# Patient Record
Sex: FEMALE | Race: WHITE | Employment: UNEMPLOYED | URBAN - METROPOLITAN AREA
[De-identification: names, ages, dates, MRNs, and addresses within clinical notes are randomized per-mention and may not be internally consistent; named-entity substitution may affect disease eponyms.]

---

## 2021-09-22 ENCOUNTER — HOSPITAL ENCOUNTER (OUTPATIENT)
Dept: LAB | Age: 76
Discharge: HOME | End: 2021-09-22

## 2021-09-22 LAB
ANION GAP SERPL CALC-SCNC: 7 MMOL/L (ref 3–18)
BUN SERPL-MCNC: 15 MG/DL (ref 7–18)
BUN/CREAT SERPL: 19 (ref 12–20)
CALCIUM SERPL-MCNC: 8.4 MG/DL (ref 8.5–10.1)
CHLORIDE SERPL-SCNC: 108 MMOL/L (ref 100–111)
CO2 SERPL-SCNC: 26 MMOL/L (ref 21–32)
CREAT SERPL-MCNC: 0.78 MG/DL (ref 0.6–1.3)
ERYTHROCYTE [DISTWIDTH] IN BLOOD BY AUTOMATED COUNT: 14.4 % (ref 11.6–14.5)
GLUCOSE SERPL-MCNC: 87 MG/DL (ref 74–99)
HCT VFR BLD AUTO: 29.7 % (ref 35–45)
HGB BLD-MCNC: 9.6 G/DL (ref 12–16)
MCH RBC QN AUTO: 32.7 PG (ref 24–34)
MCHC RBC AUTO-ENTMCNC: 32.3 G/DL (ref 31–37)
MCV RBC AUTO: 101 FL (ref 78–100)
PLATELET # BLD AUTO: 477 K/UL (ref 135–420)
PMV BLD AUTO: 9.3 FL (ref 9.2–11.8)
POTASSIUM SERPL-SCNC: 3.9 MMOL/L (ref 3.5–5.5)
RBC # BLD AUTO: 2.94 M/UL (ref 4.2–5.3)
SODIUM SERPL-SCNC: 141 MMOL/L (ref 136–145)
WBC # BLD AUTO: 8.6 K/UL (ref 4.6–13.2)

## 2021-09-22 PROCEDURE — U0003 INFECTIOUS AGENT DETECTION BY NUCLEIC ACID (DNA OR RNA); SEVERE ACUTE RESPIRATORY SYNDROME CORONAVIRUS 2 (SARS-COV-2) (CORONAVIRUS DISEASE [COVID-19]), AMPLIFIED PROBE TECHNIQUE, MAKING USE OF HIGH THROUGHPUT TECHNOLOGIES AS DESCRIBED BY CMS-2020-01-R: HCPCS

## 2021-09-22 PROCEDURE — 80048 BASIC METABOLIC PNL TOTAL CA: CPT

## 2021-09-22 PROCEDURE — 85027 COMPLETE CBC AUTOMATED: CPT

## 2021-09-23 LAB — SARS-COV-2, COV2NT: NOT DETECTED

## 2021-09-29 ENCOUNTER — HOSPITAL ENCOUNTER (OUTPATIENT)
Dept: LAB | Age: 76
Discharge: HOME | End: 2021-09-29

## 2021-09-29 PROCEDURE — U0003 INFECTIOUS AGENT DETECTION BY NUCLEIC ACID (DNA OR RNA); SEVERE ACUTE RESPIRATORY SYNDROME CORONAVIRUS 2 (SARS-COV-2) (CORONAVIRUS DISEASE [COVID-19]), AMPLIFIED PROBE TECHNIQUE, MAKING USE OF HIGH THROUGHPUT TECHNOLOGIES AS DESCRIBED BY CMS-2020-01-R: HCPCS

## 2021-09-30 LAB — SARS-COV-2, COV2NT: NOT DETECTED

## 2021-10-06 ENCOUNTER — HOSPITAL ENCOUNTER (OUTPATIENT)
Dept: LAB | Age: 76
Discharge: HOME | End: 2021-10-06

## 2021-10-06 PROCEDURE — U0003 INFECTIOUS AGENT DETECTION BY NUCLEIC ACID (DNA OR RNA); SEVERE ACUTE RESPIRATORY SYNDROME CORONAVIRUS 2 (SARS-COV-2) (CORONAVIRUS DISEASE [COVID-19]), AMPLIFIED PROBE TECHNIQUE, MAKING USE OF HIGH THROUGHPUT TECHNOLOGIES AS DESCRIBED BY CMS-2020-01-R: HCPCS

## 2021-10-07 LAB — SARS-COV-2, COV2NT: NOT DETECTED

## 2021-10-14 ENCOUNTER — HOME HEALTH ADMISSION (OUTPATIENT)
Dept: HOME HEALTH SERVICES | Facility: HOME HEALTH | Age: 76
End: 2021-10-14
Payer: MEDICARE

## 2021-10-15 ENCOUNTER — HOME CARE VISIT (OUTPATIENT)
Dept: HOME HEALTH SERVICES | Facility: HOME HEALTH | Age: 76
End: 2021-10-15
Payer: MEDICARE

## 2021-10-15 ENCOUNTER — HOME CARE VISIT (OUTPATIENT)
Dept: SCHEDULING | Facility: HOME HEALTH | Age: 76
End: 2021-10-15
Payer: MEDICARE

## 2021-10-15 PROCEDURE — 3331090002 HH PPS REVENUE DEBIT

## 2021-10-15 PROCEDURE — 400013 HH SOC

## 2021-10-15 PROCEDURE — G0299 HHS/HOSPICE OF RN EA 15 MIN: HCPCS

## 2021-10-15 PROCEDURE — A4452 WATERPROOF TAPE: HCPCS

## 2021-10-15 PROCEDURE — 3331090001 HH PPS REVENUE CREDIT

## 2021-10-15 PROCEDURE — 400018 HH-NO PAY CLAIM PROCEDURE

## 2021-10-15 PROCEDURE — MED11779

## 2021-10-15 PROCEDURE — MED10821 BANDAGE,GAUZE,4.5"X4.1YD,STERILE,LF

## 2021-10-15 PROCEDURE — MED12268

## 2021-10-16 VITALS
TEMPERATURE: 97.3 F | HEART RATE: 71 BPM | RESPIRATION RATE: 16 BRPM | SYSTOLIC BLOOD PRESSURE: 159 MMHG | DIASTOLIC BLOOD PRESSURE: 89 MMHG | OXYGEN SATURATION: 94 %

## 2021-10-16 PROCEDURE — 3331090001 HH PPS REVENUE CREDIT

## 2021-10-16 PROCEDURE — 3331090002 HH PPS REVENUE DEBIT

## 2021-10-16 NOTE — HOME HEALTH
Skilled services/Home bound verification:     Skilled Reason for admission/summary of clinical condition:  Patient was recently hospitalized for UTI, requiring observation by a SN for s/s of decomposition or adverse effects resulting from newly prescribed medications. Skilled observation needed to determine if new medication regimen prescribed requires modifications or other therapeutic interventions considered until pt's clinical condition or treatment has stabilized. .  This patient is homebound for the following reasons Requires considerable and taxing effort to leave the home , Requires the assistance of 1 or more persons to leave the home  and Only leaves the home for medical reasons or [employer] services and are infrequent and of short duration for other reasons  Caregiver: spouse. Caregiver assists with Caregiver assists patient with bathing, dressing, bathroom, meal prep and setup, medication management, grocery shopping, household chores, transportation to MD appointment and home exercise program..    Medications reconciled and all medications are available in the home this visit. The following education was provided regarding medications, medication interactions, and look alike medications (specify): menthol-zinc oxide (CALMOSEPTINE) 0.44-20.6 % oint. Medications  are effective at this time. High risk medication teaching regarding anticoagulants, hyperglycemic agents or opoid narcotics performed (specify) Jacy MAC Confer, NP notified of any discrepancies/medication interactions. Home health supplies by type and quantity ordered/delivered this visit include: All wound care supplies    Patient education provided this visit to include:   . X. Kandice Graven Kandice Graven Kandice Graven  Disease Management: Need for increased protein teaching performed about Boost, Ensure, etc 2 x daily to help with pressure ulcers and prevent pressure teaching, floating heels to prevent pressure areas, pain management, constipation prevention, fall precautions, s/s of infection, deep breathing exercises. .x.... Breathing Exercises (HEP):): Reviewed HEP in admission packet - patient performed return demonstration of deep breathing exercise. Patient to perform 5 deep breathing exercises every 2 hours, while awake. ..X... Wound Care:   Keep wound clean, covered and dry, s/s of infection  . Byron Carwin... Infection Control:   Proper hand washing with soap and water, after each trip to the bathroom and after eating meals  . Byron Carwin... Other:  safety and fall precautions, importance of repositioning patient every 2 hours to reduce pressure points, David's sign. Patient/caregiver degree of understanding:good    Home exercise program/Homework provided:     Pt/Caregiver instructed on plan of care and are agreeable to plan of care at this time. Physician Javier Vieira NP notified of patient admission to home health and plan of care including anticipated frequency of SN and treatments/interventions/modalities of SN. Discharge planning discussed with patient and caregiver. Discharge planning as follows: SN discharge when teaching and goals are met. Denise Mckeon Pt/Caregiver did verbalize understanding of discharge planning. Next MD appointment TBD (date) with Javier Vieira NP. Patient/caregiver encouraged/instructed to keep appointment as lack of follow through with physician appointment could result in discontinuation of home care services for non-compliance. Need for increased protein, teaching performed on Boost, Ensure, etc 2 x daily to help with pressure ulcers    Case communication with Intake and [place] regarding patient supposed to be having PT, OT, HHA according to her discharge paperwork, but it was not ordered by MD. Need to get order for for things, as patient is currently wheelchair bound and wants to be ambulating  like she was prior to this hospitalization.

## 2021-10-17 PROCEDURE — 3331090001 HH PPS REVENUE CREDIT

## 2021-10-17 PROCEDURE — 3331090002 HH PPS REVENUE DEBIT

## 2021-10-18 PROCEDURE — 3331090002 HH PPS REVENUE DEBIT

## 2021-10-18 PROCEDURE — 3331090001 HH PPS REVENUE CREDIT

## 2021-10-19 ENCOUNTER — HOME CARE VISIT (OUTPATIENT)
Dept: HOME HEALTH SERVICES | Facility: HOME HEALTH | Age: 76
End: 2021-10-19
Payer: MEDICARE

## 2021-10-19 ENCOUNTER — HOME CARE VISIT (OUTPATIENT)
Dept: SCHEDULING | Facility: HOME HEALTH | Age: 76
End: 2021-10-19
Payer: MEDICARE

## 2021-10-19 PROCEDURE — G0152 HHCP-SERV OF OT,EA 15 MIN: HCPCS

## 2021-10-19 PROCEDURE — 3331090002 HH PPS REVENUE DEBIT

## 2021-10-19 PROCEDURE — 3331090001 HH PPS REVENUE CREDIT

## 2021-10-19 NOTE — Clinical Note
Therapy Functional Score Assessment  Question   Score   Grooming  1       Upper Dressing 1      Lower Dressing 3      Bathing   5     Toilet Transfer 3     Transfer   5           Ambulation 6    Dyspnea                     0      Pain Interfering with activity 0  Est number therapy visits      10

## 2021-10-20 VITALS
OXYGEN SATURATION: 97 % | DIASTOLIC BLOOD PRESSURE: 75 MMHG | RESPIRATION RATE: 18 BRPM | HEART RATE: 84 BPM | TEMPERATURE: 97.6 F | SYSTOLIC BLOOD PRESSURE: 136 MMHG

## 2021-10-20 PROCEDURE — 3331090002 HH PPS REVENUE DEBIT

## 2021-10-20 PROCEDURE — 3331090001 HH PPS REVENUE CREDIT

## 2021-10-20 NOTE — HOME HEALTH
Caregiver involvement: Pt has  available in home for assist with ADLs and IADLs. Medications reconciled and all medications are available in the home this visit. The following education was provided regarding medications, medication interactions, and look a like medications: Continue as prescribed by MD.  Medications are effective at this time. Home health supplies by type and quantity ordered/delivered this visit include: N/A    Patient education provided this visit: Pt was educated to increase elevation of RLE to reduce swelling. Recommended long handled shower hose,  mat, and grab bars in walk-in shower once pt has met [facility] goals. Progress toward goals: Pt demonstrates good rehab potential as evidenced by motivation to return to [facility] and supportive  for assistance. Home exercise program: Plan to provide RUE AROM HEP and LUE strengthening HEP to increase BUE functiion for increased safety in mobility. CONTINUED NEED FOR THE FOLLOWING SKILLS: HH OT is medically necessary to address barriers of  reduced functional mobility, RUE ROM, LUE strength, and ADL function. These barriers limit pt's participation in ADLs safely and would benefit from continued skilled OT to maximize independence and reduce burden on caregiver. The following discharge planning was discussed with the pt/caregiver: 2w5 with plan to dc to home environment after all [facility] goals have been met. Pt is a 77year old female who was recently hospitalized for UTI.

## 2021-10-21 ENCOUNTER — HOME CARE VISIT (OUTPATIENT)
Dept: SCHEDULING | Facility: HOME HEALTH | Age: 76
End: 2021-10-21
Payer: MEDICARE

## 2021-10-21 PROCEDURE — 3331090001 HH PPS REVENUE CREDIT

## 2021-10-21 PROCEDURE — G0300 HHS/HOSPICE OF LPN EA 15 MIN: HCPCS

## 2021-10-21 PROCEDURE — 3331090002 HH PPS REVENUE DEBIT

## 2021-10-22 ENCOUNTER — HOME CARE VISIT (OUTPATIENT)
Dept: SCHEDULING | Facility: HOME HEALTH | Age: 76
End: 2021-10-22
Payer: MEDICARE

## 2021-10-22 ENCOUNTER — HOME CARE VISIT (OUTPATIENT)
Dept: HOME HEALTH SERVICES | Facility: HOME HEALTH | Age: 76
End: 2021-10-22
Payer: MEDICARE

## 2021-10-22 VITALS
SYSTOLIC BLOOD PRESSURE: 152 MMHG | OXYGEN SATURATION: 98 % | HEART RATE: 82 BPM | DIASTOLIC BLOOD PRESSURE: 90 MMHG | TEMPERATURE: 97.8 F | RESPIRATION RATE: 16 BRPM

## 2021-10-22 VITALS
OXYGEN SATURATION: 98 % | TEMPERATURE: 98 F | RESPIRATION RATE: 18 BRPM | HEART RATE: 94 BPM | SYSTOLIC BLOOD PRESSURE: 162 MMHG | DIASTOLIC BLOOD PRESSURE: 98 MMHG

## 2021-10-22 PROCEDURE — 3331090002 HH PPS REVENUE DEBIT

## 2021-10-22 PROCEDURE — G0151 HHCP-SERV OF PT,EA 15 MIN: HCPCS

## 2021-10-22 PROCEDURE — 3331090001 HH PPS REVENUE CREDIT

## 2021-10-22 PROCEDURE — G0158 HHC OT ASSISTANT EA 15: HCPCS

## 2021-10-22 NOTE — HOME HEALTH
PT INITIAL EVALUATION    Past Medical Hx:   Hypertension    Osteoporosis    Osteoarthritis    Polio    Left Hip Arthroplasty   Recent H/o current illness:  77 year old female presents with MD referral for HHPT s/p hospitalization due to fall and HTN with HF  Medication Management:  manages medications  Social hx and home eval:  Pt lives in 2 story home with . Caregiver Involvement:  helps with all ADL's, functioal mobility  PLOF:  Pt PLOF is ambulation w PUW, pts base level of function independent with all ADL's  BALANCE:     Seated unsupported balance is fair+   Standing static balance is unable  Standing dynamic balance is unable  Patient is at risk for falls due to recent hospitalization/weakness  BLE Strength:  Left Hip flexion 0/5 , hip abduction 0/5, hip adduction 0/5, Knee flexion 0/5  knee extension 0/5, ankle dorsiflexion 0/5  Right Hip flexion 2-/5 , hip abduction 2-/5, hip adduction 2-/5, Knee flexion 2-/5  knee extension 2-/5, ankle dorsiflexion 1/5  BLE ROM:  Right hip/knee/ankle: WFL  Left hip/knee/ankle: WFL  Bed mobility:  max A   Transfers:  max A for bed to chair, with slideboard AD. max cues and instruction needed for sequencing and coordination  GAIT:  Patient is non-ambulatory  Pt required max cues for  safety and sequencing  Stairs: unable  Patient education provided this visit: Safety with functional mobility and instruction with HEP. Instructed patient with regards to signs and symptoms of infection. Assessment: Referral for HHPT following recent hospitalization due to HTN with HF. HHPT is medically necessary to address the following clinical findings: decreased BLE strength and ROM, decreased I and safety with transfers and gait, decreased endurance, decreased balance and decreased safety in order to improve functional mobility and decrease fall risk. Pt is a fall risk as indicated by unable to stand.   Patient will be seen for HHPT 1w1, 2w6, 1w1 for therapeutic exercises to increase BLE strength and ROM,  stairs and transfers to increase I and safety with daily functional mobility and balance and endurance activities to decrease fall risk, decrease impairment and increase functional mobility and independence in the home. Pt. requires skilled PT intervention to instruct Pt. with gait training with LRAD, ROM and strengthening, transfer training, balance training, manual therapy, neuromuscular re-education, patient care-giver education, HEP, endurance training, body mechanics.

## 2021-10-22 NOTE — HOME HEALTH
SUBJECTIVE: Patient very pleasant and agreeable to OT Mandi Odonnells. Thony Negron CAREGIVER INVOLVEMENT/ASSISTANCE NEEDED FOR: Patient  helps with I/ADLs in the home. Thony Negron HOME HEALTH SUPPLIES BY TYPE AND QUANTITY ORDERED/DELIVERED THIS VISIT INCLUDE: N/A  . OBJECTIVE:  See interventions. .  ASSESSMENT OF PROGRESS TOWARD GOALS: Patient blood pressure on this visit. COE notice increased swelling in BLE. patient and her  stated she was fearfull of falling and they think that increased her blood pressure. COE Provided education on the importance of patient and caregiver prompting up client foof due to edema and the importance of keeping tract of blood pressure. COE attempted to contact the patient MD to report incresed blood pressures and swelling however PCP was not listed on chart and was listed later by nursing supevisor however client has never seen MD. Interdispliancry team will disccuss plans moving forward with this client. Jerrod and hisband intrsted to go to ED if blood pressure continue to elevate. .  CONTINUED NEED FOR THE FOLLOWING SKILLS:  [facility] OT is medically necessary to address barriers of  reduced functional mobility, RUE ROM, LUE strength, and ADL function. These barriers limit pt's participation in ADLs safely and would benefit from continued skilled OT to maximize independence and reduce burden on caregiver. Thony Negron PLAN FOR NEXT VISIT:  COE will addess RUE AROM HEP to increase ROM towards [facility] in order to increase safety and functional mobility in home environment within 10 visits. .  THE FOLLOWING DISCHARGE PLANNING WAS DISCUSSED WITH THE PATIENT/CAREGIVER: 2w5 with plan to dc to home environment after all [facility] goals have been met.

## 2021-10-23 PROCEDURE — 3331090001 HH PPS REVENUE CREDIT

## 2021-10-23 PROCEDURE — 3331090002 HH PPS REVENUE DEBIT

## 2021-10-24 PROCEDURE — 3331090001 HH PPS REVENUE CREDIT

## 2021-10-24 PROCEDURE — 3331090002 HH PPS REVENUE DEBIT

## 2021-10-25 PROCEDURE — 3331090001 HH PPS REVENUE CREDIT

## 2021-10-25 PROCEDURE — 3331090002 HH PPS REVENUE DEBIT

## 2021-10-26 ENCOUNTER — HOME CARE VISIT (OUTPATIENT)
Dept: SCHEDULING | Facility: HOME HEALTH | Age: 76
End: 2021-10-26
Payer: MEDICARE

## 2021-10-26 VITALS
TEMPERATURE: 98.3 F | SYSTOLIC BLOOD PRESSURE: 163 MMHG | HEART RATE: 83 BPM | DIASTOLIC BLOOD PRESSURE: 79 MMHG | OXYGEN SATURATION: 95 %

## 2021-10-26 VITALS
DIASTOLIC BLOOD PRESSURE: 80 MMHG | SYSTOLIC BLOOD PRESSURE: 145 MMHG | OXYGEN SATURATION: 99 % | TEMPERATURE: 97.6 F | HEART RATE: 69 BPM | RESPIRATION RATE: 18 BRPM

## 2021-10-26 PROCEDURE — 3331090001 HH PPS REVENUE CREDIT

## 2021-10-26 PROCEDURE — 3331090002 HH PPS REVENUE DEBIT

## 2021-10-26 PROCEDURE — G0152 HHCP-SERV OF OT,EA 15 MIN: HCPCS

## 2021-10-26 PROCEDURE — G0157 HHC PT ASSISTANT EA 15: HCPCS

## 2021-10-26 NOTE — HOME HEALTH
SUBJECTIVE: Pt doing well no c/o pain, no changes in medications spouse reports pt is gaining use of right UE  OBJECTIVE:  See interventions. ASSESSMENT:  Patient requires skilled PT for instruction in strengthening activities, balance and coordination activities as well as transfer and bed mobility tng. to increase independence and assist in return to prior level of function. Patient instructed in HEP consisting of supine and seated exercises to improve strength and facilitate increased independence with functional mobility. Pt. was also able to return demonstrate the HEP as instructed. Bed mobility requires Mod-Max A for supine<>sit transfers and to scoot to head of bed, Max A for sit to stand transfers from edge of bed. Cuing to improve posture with standing Max A to maintain standing position pt able to tolerate standing for 10 sec.  Pt instructed in pressure relief tech able to verbalize understanding of techniques to prevent areas of pressure  DISCUSSED POC AND DC PLANS WITH PATIENT/CAREGIVER: Pt and Spouse are aware of frequency of 2wk6, 1wk1 anticiapated DC 12/6/2021   PLAN FOR NEXT VISIT: Next visit will address strength, sit to stand transfers and bed mobiity

## 2021-10-27 VITALS
HEART RATE: 82 BPM | TEMPERATURE: 97.7 F | RESPIRATION RATE: 16 BRPM | SYSTOLIC BLOOD PRESSURE: 152 MMHG | OXYGEN SATURATION: 97 % | DIASTOLIC BLOOD PRESSURE: 88 MMHG

## 2021-10-27 PROCEDURE — 3331090001 HH PPS REVENUE CREDIT

## 2021-10-27 PROCEDURE — 3331090002 HH PPS REVENUE DEBIT

## 2021-10-27 NOTE — HOME HEALTH
SN reason for visit: education/teaching related to medication mgt ,disease mgt and assessment - hypertension  patient made aware to monitor for s/s of infection (not observed) [increased swelling, increased redness around site, increased pain, foul smelling drainage, fever] aware who to report to/when. Caregiver involvement: Patient's cg is available at all times for assistance with iadls, adls, meal prep, medication management, taking to md appointments. Medications reconciled . The following education was provided regarding medications, medication interactions, and look a like medications. Home health supplies by type and quantity ordered/delivered this visit include: n/a  Patient education provided this visit: assessment, teaching  Reviewed medications and care plan for changes. patient/cg to continue to take medications as prescribed. patient aware to monitor for effectiveness and to notify staff of any adverse reactions to medications/any changes to medication regimen. reviewed side effects, purposes, dosage, frequencies  patient encouraged to monitor for increase in pain and to continue with current pain management and to notify staff/md if pain becomes excrutiating/intolerable. Patient is aware of fall risk. Reviewed safet precautions with patient. Educated on ambulation and or bedbound safety if applicable. INSTRUCTED PATIENT AND CG THAT SHOULD ANY NEEDS OR CONCERNS ARISE TO FIRST CALL OUR OFFICE, OR THE DR'S OFFICE  OR GO TO AN URGENT CARE CENTER AND NOT TO THE ED FOR NON-LIFE THREATENING EVENTS. IF IT IS LIFE THREATENING THEN CALL 911 OR GO TO THE CLOSEST ER. Progress toward goals- continuing to work towards goals as reviewed in [address] with patient on each visit  Home exercise program:    activity as tolerated, trying to get physical activity 4-5 x weekly.  stopping activity if causing shortness of breath or chest pain, dizziness or weakness Continued need for the following skills: Nursing, The following discharge planning was discussed with the pt/caregiver: Patient will be discharged once education has completed, and patient is medically stable.

## 2021-10-27 NOTE — HOME HEALTH
Subjective: Pt reportd no pain, no recent falls, and no changes in medication this visit. Caregiver involvement: Pt's  was present during visit to assist with IADLs and transportation. Objective: See interventions. Assessment: Pt presented sitting upright in manual wc and with normal BP reading. Pt performed AROM of R shoulder flexion and abduction, R elbow flexion and extension, and R forearm rotation, 2 sets of 10 reps to increase RUE ROM for increased functional mobility. Pt performed LUE BUE strengthening HEP of shoulder abduction, scapular protraction, bicep curls, diagonal crosses, and tricep extensions, 2 sets of 10 reps for increased strength for improved functional mobility. Pt demonstrated good participation this visit, verbalizing understanding of HEP education for improved safety with mobility. Plan for Next Visit: Plan to address functional mobility with transfers and ADL training. Discharge plan: 1w1, 2w3 with 7 visits remaining. Plan to dc to home after [facility] goals have been met.

## 2021-10-28 ENCOUNTER — HOME CARE VISIT (OUTPATIENT)
Dept: SCHEDULING | Facility: HOME HEALTH | Age: 76
End: 2021-10-28
Payer: MEDICARE

## 2021-10-28 VITALS
OXYGEN SATURATION: 100 % | SYSTOLIC BLOOD PRESSURE: 140 MMHG | HEART RATE: 85 BPM | TEMPERATURE: 98.2 F | DIASTOLIC BLOOD PRESSURE: 82 MMHG

## 2021-10-28 PROCEDURE — G0157 HHC PT ASSISTANT EA 15: HCPCS

## 2021-10-28 PROCEDURE — G0300 HHS/HOSPICE OF LPN EA 15 MIN: HCPCS

## 2021-10-28 PROCEDURE — 3331090001 HH PPS REVENUE CREDIT

## 2021-10-28 PROCEDURE — 3331090002 HH PPS REVENUE DEBIT

## 2021-10-28 NOTE — HOME HEALTH
SUBJECTIVE: Pt doing well no c/o pain, no changes in medications  OBJECTIVE:  See interventions. ASSESSMENT:  Patient requires skilled PT for instruction in strengthening activities, balance and coordination activities as well as transfer and bed mobility tng. to increase independence and assist in return to prior level of function. Patient instructed in HEP consisting of seated strengthening exercises to improve strength and facilitate increased independence with functional mobility. Pt. was able to return demonstrate seated exercises to include wc push ups and trunk flection improve UE strength and assit with transfers, Max A for sit to stand transfers w/Max VCs to improve body mechanics. Pt unable to maintain standing. Reviewed pressure relief techniques spouse able to teach back.  Pt tolerated increaed sets of therex without complaints of pain, c/o minimal fatigue following tx peg    DISCUSSED POC AND DC PLANS WITH PATIENT/CAREGIVER: Pt and Spouse are aware of frequency of 2wk6, 1wk1 anticiapated DC 12/6/2021   PLAN FOR NEXT VISIT: Next visit will address strength, sit to stand transfers and bed mobiity

## 2021-10-29 ENCOUNTER — HOME CARE VISIT (OUTPATIENT)
Dept: SCHEDULING | Facility: HOME HEALTH | Age: 76
End: 2021-10-29
Payer: MEDICARE

## 2021-10-29 PROCEDURE — 3331090002 HH PPS REVENUE DEBIT

## 2021-10-29 PROCEDURE — G0158 HHC OT ASSISTANT EA 15: HCPCS

## 2021-10-29 PROCEDURE — 3331090001 HH PPS REVENUE CREDIT

## 2021-10-30 VITALS
SYSTOLIC BLOOD PRESSURE: 132 MMHG | OXYGEN SATURATION: 98 % | TEMPERATURE: 98 F | DIASTOLIC BLOOD PRESSURE: 84 MMHG | RESPIRATION RATE: 18 BRPM | HEART RATE: 86 BPM

## 2021-10-30 PROCEDURE — 3331090002 HH PPS REVENUE DEBIT

## 2021-10-30 PROCEDURE — 3331090001 HH PPS REVENUE CREDIT

## 2021-10-30 NOTE — HOME HEALTH
SUBJECTIVE: Patient very pleasant and agreeable to OT [employer]i [employer]. Denise Mckeon CAREGIVER INVOLVEMENT/ASSISTANCE NEEDED FOR: Patient  helps with I/ADLs in the home. Denise Mckeon HOME HEALTH SUPPLIES BY TYPE AND QUANTITY ORDERED/DELIVERED THIS VISIT INCLUDE: N/A    . OBJECTIVE:  See interventions. .    ASSESSMENT OF PROGRESS TOWARD GOALS:Patient has showen great progression on OT POC addressing functional mobilty and transfers. Pt Client able to employ static standing tasks for two intervals of 1 min and 10 secs and 1 min and 20 secs with use of countertop with poor+ balance with Max A x2  UE support and  with four periods of  LOB for preparation of performing ADLS/IADLS. Therapist assessed client on  sit to stands 3x to improve activity standing tolerance to perform functional mobility tasks; in return client able to demo sit to stand for couch with Max A X2, while therapist implemented 75% of TI of proper body alignment and trunk control and VI on placing hand back when sitting down to ensure safety and prevent falls. COE provided Max verbal cuing provided for proper hand and foor placement when completing stands. Pt performed functional transfers such as supine to sit transfer using hospital bed rails with minimum assist towards unsupported sit on EOB in order to prepare for ADLS. Denise Mckeon CONTINUED NEED FOR THE FOLLOWING SKILLS:  HH OT is medically necessary to address barriers of  reduced functional mobility, RUE ROM, LUE strength, and ADL function. These barriers limit pt's participation in ADLs safely and would benefit from continued skilled OT to maximize independence and reduce burden on caregiver. Denise Mckeon PLAN FOR NEXT VISIT:  Co-tx with LPTA to addressing sit to stands in prepartion for transfers to 3:1 commode. .    THE FOLLOWING DISCHARGE PLANNING WAS DISCUSSED WITH THE PATIENT/CAREGIVER: 2w5 with plan to dc to home environment after all [facility] goals have been met.

## 2021-10-31 PROCEDURE — 3331090002 HH PPS REVENUE DEBIT

## 2021-10-31 PROCEDURE — 3331090001 HH PPS REVENUE CREDIT

## 2021-11-01 VITALS
HEART RATE: 79 BPM | TEMPERATURE: 97.7 F | RESPIRATION RATE: 16 BRPM | DIASTOLIC BLOOD PRESSURE: 87 MMHG | SYSTOLIC BLOOD PRESSURE: 152 MMHG | OXYGEN SATURATION: 96 %

## 2021-11-01 PROCEDURE — 3331090002 HH PPS REVENUE DEBIT

## 2021-11-01 PROCEDURE — 3331090001 HH PPS REVENUE CREDIT

## 2021-11-01 NOTE — HOME HEALTH
SN reason for visit: education/teaching related to medication mgt ,disease mgt and assessment reviewed hypertension diet, stress and anxiety related   patient made aware to monitor for s/s of infection (not observed) [increased swelling, increased redness around site, increased pain, foul smelling drainage, fever] aware who to report to/when. Caregiver involvement: Patient's cg is available at all times for assistance with iadls, adls, meal prep, medication management, taking to md appointments. Medications reconciled . The following education was provided regarding medications, medication interactions, and look a like medications. Home health supplies by type and quantity ordered/delivered this visit include: n/a  Patient education provided this visit: assessment, teaching  Reviewed medications and care plan for changes. patient/cg to continue to take medications as prescribed. patient aware to monitor for effectiveness and to notify staff of any adverse reactions to medications/any changes to medication regimen. reviewed side effects, purposes, dosage, frequencies  patient encouraged to monitor for increase in pain and to continue with current pain management and to notify staff/md if pain becomes excrutiating/intolerable. Patient is aware of fall risk. Reviewed safet precautions with patient. Educated on ambulation and or bedbound safety if applicable. INSTRUCTED PATIENT AND CG THAT SHOULD ANY NEEDS OR CONCERNS ARISE TO FIRST CALL OUR OFFICE, OR THE DR'S OFFICE  OR GO TO AN URGENT CARE CENTER AND NOT TO THE ED FOR NON-LIFE THREATENING EVENTS. IF IT IS LIFE THREATENING THEN CALL 911 OR GO TO THE CLOSEST ER. Progress toward goals- continuing to work towards goals as reviewed in [address] with patient on each visit  Home exercise program:    activity as tolerated, trying to get physical activity 4-5 x weekly.  stopping activity if causing shortness of breath or chest pain, dizziness or weakness Continued need for the following skills: Nursing, The following discharge planning was discussed with the pt/caregiver: Patient will be discharged once education has completed, and patient is medically stable.

## 2021-11-02 ENCOUNTER — HOME CARE VISIT (OUTPATIENT)
Dept: SCHEDULING | Facility: HOME HEALTH | Age: 76
End: 2021-11-02
Payer: MEDICARE

## 2021-11-02 VITALS
TEMPERATURE: 98.2 F | DIASTOLIC BLOOD PRESSURE: 85 MMHG | OXYGEN SATURATION: 98 % | HEART RATE: 88 BPM | SYSTOLIC BLOOD PRESSURE: 145 MMHG

## 2021-11-02 VITALS
OXYGEN SATURATION: 98 % | HEART RATE: 87 BPM | SYSTOLIC BLOOD PRESSURE: 145 MMHG | TEMPERATURE: 98.2 F | RESPIRATION RATE: 17 BRPM | DIASTOLIC BLOOD PRESSURE: 85 MMHG

## 2021-11-02 PROCEDURE — 3331090002 HH PPS REVENUE DEBIT

## 2021-11-02 PROCEDURE — 3331090001 HH PPS REVENUE CREDIT

## 2021-11-02 PROCEDURE — G0157 HHC PT ASSISTANT EA 15: HCPCS

## 2021-11-02 PROCEDURE — G0158 HHC OT ASSISTANT EA 15: HCPCS

## 2021-11-02 NOTE — HOME HEALTH
SUBJECTIVE: Pt doing well no c/o pain, no changes in medications, spouse states pt has been compliant with HEP and performs 2x day   OBJECTIVE:  See interventions. ASSESSMENT:  Patient requires skilled PT for instruction in strengthening activities, balance and coordination activities as well as transfer and bed mobility tng. to increase independence and assist in return to prior level of function. Patient instructed in forward flexion to strengthening core, increase RLE ROM and to improve body mechanics in prep for sit to stand tranfsers. WC push up x 6 attempts pt unable to clear WC.  sit to stand transfers x 4 Max A x 2 pt able to tolerate static standing x 10-40sec with Max cuing for posture. Pt instructed in sliding board transfers WC<>Bed assist needed to position sliding board pt able to transfer with Min A provided to and from [employer]. Improved endurance with decreased report of fatigue today, pt able to tolerate standing today unable to stand previous tx. Pt progressing toward established goals.     DISCUSSED POC AND DC PLANS WITH PATIENT/CAREGIVER: Pt and Spouse are aware of frequency of 2wk6, 1wk1 anticiapated DC 12/6/2021   PLAN FOR NEXT VISIT: Next visit will address strength, sit to stand transfers and bed mobiity

## 2021-11-03 ENCOUNTER — HOME CARE VISIT (OUTPATIENT)
Dept: SCHEDULING | Facility: HOME HEALTH | Age: 76
End: 2021-11-03
Payer: MEDICARE

## 2021-11-03 PROCEDURE — G0299 HHS/HOSPICE OF RN EA 15 MIN: HCPCS

## 2021-11-03 PROCEDURE — 3331090002 HH PPS REVENUE DEBIT

## 2021-11-03 PROCEDURE — 3331090001 HH PPS REVENUE CREDIT

## 2021-11-03 NOTE — HOME HEALTH
SUBJECTIVE: Patient very pleasant and agreeable to OT Mandi Mcfarland. Sue Shaw CAREGIVER INVOLVEMENT/ASSISTANCE NEEDED FOR: Patient  helps with I/ADLs in the home. Sue Shaw HOME HEALTH SUPPLIES BY TYPE AND QUANTITY ORDERED/DELIVERED THIS VISIT INCLUDE: N/A         . OBJECTIVE:  See interventions. .         ASSESSMENT OF PROGRESS TOWARD GOALS:Co-tx with LPTA due to patients fear of falling and decreased activity tolerance. Patient has showen great progression on OT POC addressing BUE Exercises functional mobilty and transfers. Therapist instructed client on HEP which consists of BUE exercises( Elbow flexion,Elbow Extension, Forearm exercises, Wrist flexion and Extension,Shoulder Press, Shoulder flexion Shoulder Rotation, shoulder Abduction) using 2# weights 2x10 reps,seated, in the anterior plane to increase BUE strength,endurance, ROM and flexibility to perform trans[employer] transfers and iadls, in return client able to demo tasks with SBA, while therapist provided education and instruction to caregiver and client for activity success. Client able to employ static standing tasks at counter top for 3 intervals demostrating 1 min and 15 secs mins with poor balance with Max A x2 to improve functional reach with use of right and left hand fo feeding, grooming and to assit caregiver with functional transfers. .         CONTINUED NEED FOR THE FOLLOWING SKILLS:  [facility] to address barriers of  reduced functional mobility, RUE ROM, LUE strength, and ADL function. These barriers limit pt's participation in ADLs safely and would benefit from continued skilled OT to maximize independence and reduce burden on caregiver. Sue Shaw PLAN FOR NEXT VISIT:  Co-tx with LPTA to addressing sit to stands in prepartion for transfers to 3:1 commode.          .         THE FOLLOWING DISCHARGE PLANNING WAS DISCUSSED WITH THE PATIENT/CAREGIVER: 2w5 with plan to dc to home environment after all [facility] goals have been met.

## 2021-11-04 ENCOUNTER — HOME CARE VISIT (OUTPATIENT)
Dept: SCHEDULING | Facility: HOME HEALTH | Age: 76
End: 2021-11-04
Payer: MEDICARE

## 2021-11-04 VITALS
TEMPERATURE: 98 F | SYSTOLIC BLOOD PRESSURE: 118 MMHG | OXYGEN SATURATION: 96 % | DIASTOLIC BLOOD PRESSURE: 64 MMHG | HEART RATE: 81 BPM | RESPIRATION RATE: 16 BRPM

## 2021-11-04 PROCEDURE — 3331090001 HH PPS REVENUE CREDIT

## 2021-11-04 PROCEDURE — G0157 HHC PT ASSISTANT EA 15: HCPCS

## 2021-11-04 PROCEDURE — G0158 HHC OT ASSISTANT EA 15: HCPCS

## 2021-11-04 PROCEDURE — 3331090002 HH PPS REVENUE DEBIT

## 2021-11-04 NOTE — HOME HEALTH
Skilled reason for visit: reassessment for continuation of HH, huerta assessment    Caregiver involvement:  available for ALDs, meal prep, med management and transportation. Medications reviewed and all medications are available in the home this visit. The following education was provided regarding medications, medication interactions, and look alike medications (specify): calmoseptine as needed. Medications  are effective at this time. Home health supplies by type and quantity ordered/delivered this visit include: n/a    Patient education provided this visit: Importance on keeping huerta catheter clean and free of kinks, and ensure that the bag isnt being tugged on when not using a securement device. patient made aware to turn every 2 hours and to keep pressure off of bony prominences, to monitor for any pressure ulcer development/worsening    Skilled Care Performed this visit: wound assessment, huerta assessment. Pt spouse changes dressings to r heel, states he is comfortable doing that on his own. Patient to see urology on 11/24 to have huerta removed. Patient's Progress towards personal goals: good    Home exercise program: as directed by PT/OT    Continued need for the following skills: Nursing, Physical Therapy and Occupational Therapy    Plan for next visit: Assessment, disciplince DC    Patient and/or caregiver notified and agrees to changes in the Plan of Care N/A      The following discharge planning was discussed with the pt/caregiver: DC when SN no longer needed.

## 2021-11-05 VITALS
SYSTOLIC BLOOD PRESSURE: 162 MMHG | TEMPERATURE: 98.3 F | OXYGEN SATURATION: 98 % | DIASTOLIC BLOOD PRESSURE: 80 MMHG | HEART RATE: 100 BPM

## 2021-11-05 VITALS
DIASTOLIC BLOOD PRESSURE: 84 MMHG | SYSTOLIC BLOOD PRESSURE: 132 MMHG | OXYGEN SATURATION: 98 % | RESPIRATION RATE: 18 BRPM | HEART RATE: 88 BPM | TEMPERATURE: 98 F

## 2021-11-05 PROCEDURE — 3331090001 HH PPS REVENUE CREDIT

## 2021-11-05 PROCEDURE — 3331090002 HH PPS REVENUE DEBIT

## 2021-11-05 NOTE — HOME HEALTH
SUBJECTIVE: Patient very pleasant and agreeable to OT Mandi Mcfarland. Ruma Garcia CAREGIVER INVOLVEMENT/ASSISTANCE NEEDED FOR: Patient  helps with I/ADLs in the home. Ruma Garcia HOME HEALTH SUPPLIES BY TYPE AND QUANTITY ORDERED/DELIVERED THIS VISIT INCLUDE: N/A                   . OBJECTIVE:  See interventions. .                   ASSESSMENT OF PROGRESS TOWARD GOALS:Co-tx with LPTA due to patients fear of falling and decreased activity tolerance. Patient has showen great progression on OT POC addressing BUE Exercises functional mobilty and transfers. Therapist instructed client on HEP which consists of BUE exercises( Elbow flexion,Elbow Extension, Forearm exercises, Wrist flexion and Extension,Shoulder Press, Shoulder flexion Shoulder Rotation, shoulder Abduction) using 2# weights 2x10 reps,seated, in the anterior plane to increase BUE strength,endurance, ROM and flexibility to perform transnational transfers and iadls, in return client able to demo tasks with SBA, while therapist provided education and instruction to caregiver and client for activity success. Client able to employ static standing tasks at counter top for 3 intervals demostrating 1 min and 15 secs mins with poor balance with CGA/Min A  x2 to improve functional reach with use of right and left hand fo feeding, grooming and to assit caregiver with functional transfers. .                   CONTINUED NEED FOR THE FOLLOWING SKILLS:  [facility] OT is medically necessary to address barriers of  reduced functional mobility, RUE ROM, LUE strength, and ADL function. These barriers limit pt's participation in ADLs safely and would benefit from continued skilled OT to maximize independence and reduce burden on caregiver. Ruma Garcia                    PLAN FOR NEXT VISIT:  Co-tx with LPTA to addressing sit to stands in prepartion for transfers to 3:1 commode. .                   THE FOLLOWING DISCHARGE PLANNING WAS DISCUSSED WITH THE PATIENT/CAREGIVER: 2w5 with plan to dc to home environment after all [facility] goals have been met.

## 2021-11-05 NOTE — HOME HEALTH
SUBJECTIVE: Pt doing well no c/o pain, no changes in medications, spouse states pt has been compliant with HEP and performs 2x day, spouse states pt has been able to perform sliding board transfers with decreased effort and assistance   OBJECTIVE:  See interventions. ASSESSMENT:  Patient requires skilled PT for instruction in strengthening activities, balance and coordination activities as well as transfer and bed mobility tng. to increase independence and assist in return to prior level of function. Patient instructed in forward flexion to strengthening core, increase RLE ROM and to improve body mechanics in prep for sit to stand tranfsers x 10,  WC push up x 5 pt demonstrates good return demonstation able to extend without pushing up on knees pt able to clear chair with WC push up with Minimal assist.  Max A for sit to stand tranfers at Pt instructed in bed mobility, rolling right<>left with use of Bedrail and CG-Min A to manage LLE. Pt instructed in sliding board transfes bed to WC with CG-Min A. WC mobility x 10ft to improve strength and RUE ROM.   Pt progressing toward established goals demonstrates improved strength, endurance and ROM  DISCUSSED POC AND DC PLANS WITH PATIENT/CAREGIVER: Pt and Spouse are aware of frequency of 2wk6, 1wk1 anticiapated DC 12/6/2021   PLAN FOR NEXT VISIT: Next visit will address strength, sit to stand transfers wt shifting

## 2021-11-06 PROCEDURE — 3331090002 HH PPS REVENUE DEBIT

## 2021-11-06 PROCEDURE — 3331090001 HH PPS REVENUE CREDIT

## 2021-11-07 PROCEDURE — 3331090002 HH PPS REVENUE DEBIT

## 2021-11-07 PROCEDURE — 3331090001 HH PPS REVENUE CREDIT

## 2021-11-08 ENCOUNTER — HOME CARE VISIT (OUTPATIENT)
Dept: SCHEDULING | Facility: HOME HEALTH | Age: 76
End: 2021-11-08
Payer: MEDICARE

## 2021-11-08 PROCEDURE — 3331090002 HH PPS REVENUE DEBIT

## 2021-11-08 PROCEDURE — G0158 HHC OT ASSISTANT EA 15: HCPCS

## 2021-11-08 PROCEDURE — 3331090001 HH PPS REVENUE CREDIT

## 2021-11-09 ENCOUNTER — HOME CARE VISIT (OUTPATIENT)
Dept: SCHEDULING | Facility: HOME HEALTH | Age: 76
End: 2021-11-09
Payer: MEDICARE

## 2021-11-09 VITALS — HEART RATE: 80 BPM | OXYGEN SATURATION: 98 % | TEMPERATURE: 98.2 F

## 2021-11-09 VITALS
HEART RATE: 86 BPM | OXYGEN SATURATION: 98 % | TEMPERATURE: 98 F | RESPIRATION RATE: 17 BRPM | DIASTOLIC BLOOD PRESSURE: 84 MMHG | SYSTOLIC BLOOD PRESSURE: 142 MMHG

## 2021-11-09 PROCEDURE — G0158 HHC OT ASSISTANT EA 15: HCPCS

## 2021-11-09 PROCEDURE — G0157 HHC PT ASSISTANT EA 15: HCPCS

## 2021-11-09 PROCEDURE — 3331090001 HH PPS REVENUE CREDIT

## 2021-11-09 PROCEDURE — 3331090002 HH PPS REVENUE DEBIT

## 2021-11-09 NOTE — HOME HEALTH
SUBJECTIVE: Pt doing well no c/o pain, no changes in medications, no falls falls reported   OBJECTIVE:  See interventions. ASSESSMENT:  Patient requires skilled PT for instruction in strengthening activities, balance and coordination activities as well as transfer tng and bed mobility tng. to increase independence and assist in return to prior level of function. CoTx with COE Patient instructed in core and RLE strengthening. sit to stand transfer training from [facility],  to [facility] transfers. Pt able to take a few steps to transfer from [facility] to [facility] w/ Max A, sit to stand transfers from [facility] require Max A Max VCs to improve body mechanics. Pt able to tolerate standing for up to 15 seconds, instruction for wt shifitng in prep for ambulation, Attempted amb pt very fearly unable to shift wt to take steps forward.  Spouse instructed to continue sit to stand transfers at kitchen counter withwt shiting Pt progressing toward established goals demonstrates improved strength, endurance and ROM  DISCUSSED POC AND DC PLANS WITH PATIENT/CAREGIVER: Pt and Spouse are aware of frequency of 2wk6, 1wk1 anticiapated DC 12/6/2021   PLAN FOR NEXT VISIT: Next visit will address strength, sit to stand transfers wt shifting in prep for possible ambulation

## 2021-11-09 NOTE — HOME HEALTH
SUBJECTIVE: Patient very pleasant and agreeable to OT Mandi [employer]. Gage Lucas CAREGIVER INVOLVEMENT/ASSISTANCE NEEDED FOR: Patient  helps with I/ADLs in the home. Gage Lucas HOME HEALTH SUPPLIES BY TYPE AND QUANTITY ORDERED/DELIVERED THIS VISIT INCLUDE: N/A                                       . OBJECTIVE:  See interventions. .                                       ASSESSMENT OF PROGRESS TOWARD GOALS:COE assessed client on BADLS to promote safety and independence with daily life tasks. COE provided verbal cuing and education for energy conservation techs such as pacing oneself when performing dressing tasks and allowing periods of rest while engaging in in I/ADLS. COE also provided education on the proper use of DME(Sock aide and Reacher). In return, pt employed grooming tasks (Oral care, Combing and brushing hair, and Washing face) with SBA once s/u is completed at bed side table-level. Ms. Maddison Parra performed UB dressing with MOD A and LB dressing such donning /doffing pants with MOD A  and donning socks with Max A with use of sock aide while implementing energy conservation techniques to prevent exertion. Pt declined participation in shower due to modesty, however pt was in agreeance to performing dressing tasks with COE. COE provided education on energy conservation techniques such as pacing herself when performing I/ADLS, Donning/ doffing shirt and pants and then standing to pull them up over the posterior area to prevent fatigue in functional activity and allowing periods of breaks when performing dressing tasks                           .                                        CONTINUED NEED FOR THE FOLLOWING SKILLS:  [facility] OT is medically necessary to address barriers of  reduced functional mobility, RUE ROM, LUE strength, and ADL function. These barriers limit pt's participation in ADLs safely and would benefit from continued skilled OT to maximize independence and reduce burden on caregiver. Precious Vicente PLAN FOR NEXT VISIT:  Co-tx with LPTA to addressing sit to stands in prepartion for transfers to 3:1 commode. THE FOLLOWING DISCHARGE PLANNING WAS DISCUSSED WITH THE PATIENT/CAREGIVER: 2w5 with plan to dc to home environment after all [facility] goals have been met.

## 2021-11-10 PROCEDURE — 3331090002 HH PPS REVENUE DEBIT

## 2021-11-10 PROCEDURE — 3331090001 HH PPS REVENUE CREDIT

## 2021-11-11 ENCOUNTER — HOME CARE VISIT (OUTPATIENT)
Dept: SCHEDULING | Facility: HOME HEALTH | Age: 76
End: 2021-11-11
Payer: MEDICARE

## 2021-11-11 VITALS
DIASTOLIC BLOOD PRESSURE: 69 MMHG | SYSTOLIC BLOOD PRESSURE: 142 MMHG | OXYGEN SATURATION: 98 % | TEMPERATURE: 97.7 F | HEART RATE: 71 BPM

## 2021-11-11 PROCEDURE — G0157 HHC PT ASSISTANT EA 15: HCPCS

## 2021-11-11 PROCEDURE — 3331090002 HH PPS REVENUE DEBIT

## 2021-11-11 PROCEDURE — 3331090001 HH PPS REVENUE CREDIT

## 2021-11-11 NOTE — HOME HEALTH
SUBJECTIVE: Patient stated she been participating more in balance retraining. Thony Negron CAREGIVER INVOLVEMENT/ASSISTANCE NEEDED FOR: Patient  helps with All I/ADL's. Thony Negron HOME HEALTH SUPPLIES BY TYPE AND QUANTITY ORDERED/DELIVERED THIS VISIT INCLUDE: N/A   . OBJECTIVE:  See interventions. .  ASSESSMENT OF PROGRESS TOWARD GOALS:Client performed all LB dressing tasks (donning and doffing pants and underpants) with Mod A with use of HIP KIT for Compensation techniques. COE instrusted client on  backwards chaining such as placing the shirt on client first and then instrusting client to placing arm through sleeves. .    CONTINUED NEED FOR THE FOLLOWING SKILLS: [place] OT is medically necessary to address barriers of  reduced functional mobility, RUE ROM, LUE strength, and ADL function. These barriers limit pt's participation in ADLs safely and would benefit from continued skilled OT to maximize independence and reduce burden on caregiver. Therapist instructed client on functional mobilty tasks to promote independence with iadls and adls to reduce caregiver burnout; in return client able to demo functional mobility tasks with FWW/CGA ambulating approx. 10 ft in home, maneuvering through obstacle course( Living room),while therapist provided instructional cueing on correct hand placement on FWW, education on the importance of using DME at all times to prevent falls and tactle instructions for correct proper body alignment and trunk control when maneuvering and propelling w/c. Therapist instructed client on w/c propulsion to promote independence with iadls;in return client able to demo functional mobility tasks with CGA ambulating approx. 50 ft in home, maneuvering through obstacle course( Living room,kitchen, and hallway),while therapist provided instructional cueing on correct hand placement on wheel on DME and tactle instructions for correct proper body alignment and trunk control when maneuvering and propelling remy Goodman PLAN FOR NEXT VISIT: OT REASSESSMENT     . THE FOLLOWING DISCHARGE PLANNING WAS DISCUSSED WITH THE PATIENT/CAREGIVER: The following discharge planning was discussed with the pt/caregiver: 2w5 with plan to dc to home environment after all [facility] goals have been met.

## 2021-11-11 NOTE — HOME HEALTH
S:  No c/o pain, no falls reported no changes in medicaitons   O:  See intervention   A:BED MOBILITY: Pt is Mejia all bed mobility which demonstrates an improvement from the initial evaluation of Max A. This allows for the patient to be functionally more independent. TRANSFERS: Pt. is Max A with sit to stand transfers from [facility], Bed <>WC transfers require Min-Mod A with use of sliding board which demonstrates and improvement from the initial evaluation score of Max A.  This allows the patient increased functional independence and mobility  GAIT/WC MOBILITY: Pt is non ambulatory at this time   Added resistive RLE strengthening exercises, resistive shadow boxing with Tband to improve core strength   P: Pt scheduled for reassessment next visit

## 2021-11-12 PROCEDURE — 3331090002 HH PPS REVENUE DEBIT

## 2021-11-12 PROCEDURE — 3331090001 HH PPS REVENUE CREDIT

## 2021-11-13 PROCEDURE — 3331090002 HH PPS REVENUE DEBIT

## 2021-11-13 PROCEDURE — 3331090001 HH PPS REVENUE CREDIT

## 2021-11-14 PROCEDURE — 3331090002 HH PPS REVENUE DEBIT

## 2021-11-14 PROCEDURE — 400018 HH-NO PAY CLAIM PROCEDURE

## 2021-11-14 PROCEDURE — 3331090001 HH PPS REVENUE CREDIT

## 2021-11-15 PROCEDURE — 3331090002 HH PPS REVENUE DEBIT

## 2021-11-15 PROCEDURE — 3331090001 HH PPS REVENUE CREDIT

## 2021-11-16 ENCOUNTER — HOME CARE VISIT (OUTPATIENT)
Dept: SCHEDULING | Facility: HOME HEALTH | Age: 76
End: 2021-11-16
Payer: MEDICARE

## 2021-11-16 ENCOUNTER — HOME CARE VISIT (OUTPATIENT)
Dept: HOME HEALTH SERVICES | Facility: HOME HEALTH | Age: 76
End: 2021-11-16
Payer: MEDICARE

## 2021-11-16 VITALS
TEMPERATURE: 98.1 F | HEART RATE: 84 BPM | RESPIRATION RATE: 16 BRPM | SYSTOLIC BLOOD PRESSURE: 137 MMHG | OXYGEN SATURATION: 97 % | DIASTOLIC BLOOD PRESSURE: 87 MMHG

## 2021-11-16 PROCEDURE — 400013 HH SOC

## 2021-11-16 PROCEDURE — 3331090002 HH PPS REVENUE DEBIT

## 2021-11-16 PROCEDURE — 3331090001 HH PPS REVENUE CREDIT

## 2021-11-16 PROCEDURE — G0151 HHCP-SERV OF PT,EA 15 MIN: HCPCS

## 2021-11-16 PROCEDURE — G0152 HHCP-SERV OF OT,EA 15 MIN: HCPCS

## 2021-11-17 PROCEDURE — 3331090002 HH PPS REVENUE DEBIT

## 2021-11-17 PROCEDURE — 3331090001 HH PPS REVENUE CREDIT

## 2021-11-18 ENCOUNTER — HOME CARE VISIT (OUTPATIENT)
Dept: SCHEDULING | Facility: HOME HEALTH | Age: 76
End: 2021-11-18
Payer: MEDICARE

## 2021-11-18 VITALS
DIASTOLIC BLOOD PRESSURE: 88 MMHG | TEMPERATURE: 97.7 F | OXYGEN SATURATION: 100 % | HEART RATE: 73 BPM | SYSTOLIC BLOOD PRESSURE: 149 MMHG

## 2021-11-18 PROCEDURE — G0158 HHC OT ASSISTANT EA 15: HCPCS

## 2021-11-18 PROCEDURE — G0157 HHC PT ASSISTANT EA 15: HCPCS

## 2021-11-18 PROCEDURE — 3331090001 HH PPS REVENUE CREDIT

## 2021-11-18 PROCEDURE — 3331090002 HH PPS REVENUE DEBIT

## 2021-11-18 NOTE — HOME HEALTH
S:  Pt doing well no c/o pain, no changes in medications   O:  See intervention  A:  Patient requires skilled PT for instruction in strengthening activities, balance and coordination activities as well as gait, transfer and safety tng to increase independence and assist in return to prior level of function. HEP to improve strength and facilitate increased independence with functional mobility. Pt able to return demonstrate core strengthening ex pt able to perform sit to stand transfers with decreased effort  cuing to improve posture,  Pt instucted in gait sequening and was able to amb 10ft total x 3 attempts with SW Mod-Max A provided.  Recommend FWW for easier advancement of walker during ambulation   P: pt will demonstrate improved posture with static standing and amb will amb x 10-15 ft with improved stride length

## 2021-11-19 PROCEDURE — 3331090001 HH PPS REVENUE CREDIT

## 2021-11-19 PROCEDURE — 3331090002 HH PPS REVENUE DEBIT

## 2021-11-20 PROCEDURE — 3331090001 HH PPS REVENUE CREDIT

## 2021-11-20 PROCEDURE — 3331090002 HH PPS REVENUE DEBIT

## 2021-11-20 NOTE — HOME HEALTH
SUBJECTIVE: Patient stated she been participating more in balance retraining. Shilo Whyte CAREGIVER INVOLVEMENT/ASSISTANCE NEEDED FOR: Patient  helps with All I/ADL's. Shilo Whyte HOME HEALTH SUPPLIES BY TYPE AND QUANTITY ORDERED/DELIVERED THIS VISIT INCLUDE: N/A     . OBJECTIVE:  See interventions. .    ASSESSMENT OF PROGRESS TOWARD GOALS:Therapist instructed client on functional mobilty tasks to promote independence with iadls and adls to reduce caregiver burnout; in return client able to demo functional mobility tasks with FWW/ CGA ambulating approx. 50 ft in [place] , maneuvering through obstacle course( Living room and kicthen ),while therapist provided instructional cueing on correct hand placement on RWW education on the importance of using DME at all times to prevent falls and tactle instructions for correct proper body alignment and trunk control when maneuvering perofrming funtional mobilty. Therapist instructed client on w/c propulsion to promote independence with iadls;in return client able to demo functional mobility tasks with CGA ambulating approx. 50 ft in home, maneuvering through obstacle course( Living room,kitchen, and hallway),while therapist provided instructional cueing on correct hand placement on wheel on DME and tactle instructions for correct proper body alignment and trunk control when maneuvering and propelling w/c. Shilo Whyte CONTINUED NEED FOR THE FOLLOWING SKILLS: HH OT is medically necessary to address barriers of  reduced functional mobility, RUE ROM, LUE strength, and ADL function. These barriers limit pt's participation in ADLs safely and would benefit from continued skilled OT to maximize independence and reduce burden on caregiver. Shilo Whyte PLAN FOR NEXT VISIT: COE address transfers to and from [facility] while addressing functional mobilty with physical therapy.          .    THE FOLLOWING DISCHARGE PLANNING WAS DISCUSSED WITH THE PATIENT/CAREGIVER: The following discharge planning was discussed with the pt/caregiver: 2w5 with plan to dc to home environment after all [facility] goals have been met.

## 2021-11-21 VITALS
OXYGEN SATURATION: 98 % | HEART RATE: 79 BPM | TEMPERATURE: 98 F | RESPIRATION RATE: 16 BRPM | SYSTOLIC BLOOD PRESSURE: 128 MMHG | DIASTOLIC BLOOD PRESSURE: 84 MMHG

## 2021-11-21 PROCEDURE — 3331090001 HH PPS REVENUE CREDIT

## 2021-11-21 PROCEDURE — 3331090002 HH PPS REVENUE DEBIT

## 2021-11-21 NOTE — HOME HEALTH
Subjective: Pt reported no pain, no recent falls, and no changes in medication this visit. Caregiver involvement: Pt's  was present during visit to assist with ADLs, IADLs, and transportation. Objective: See interventions. Assessment: OT and COE were present during visit to reassess need for continued [facility] OT services. Pt has been progressing very well from functional baseline from requiring max assist with ADLs and bed mobility to currently requiring mod assist with self care, min assist with bed mobility, and min-mod assist with bed to wc transfer using hospital bed rails. Pt participated in discussion of continuation of OT, requiring education of OT's role in increasing functional mobility performance for imporved ADL participation. Pt and  report she can tolerate 3 minutes of standing at kitchen counter from manual wc. Pt was receptive to resuming [facility] OT to address functional transfer to commode and to manage LB dressing using counter for support. CONTINUED NEED FOR THE FOLLOWING SKILLS: [facility] OT is medically necessary to address barriers of reduced ADL function and funcitonal mobility. These barriers limit pt's participation in ADLs safely and would benefit from continued skilled OT to maximize independence and reduce burden on caregiver. Plan for Next Visit: Plan to resume [facility] OT to address functional mobility with transfer to commode. Discharge plan: 2w2, 1w1 with plan to dc to home environment once [facility] OT goals have been met.

## 2021-11-22 ENCOUNTER — HOME CARE VISIT (OUTPATIENT)
Dept: HOME HEALTH SERVICES | Facility: HOME HEALTH | Age: 76
End: 2021-11-22
Payer: MEDICARE

## 2021-11-22 ENCOUNTER — HOME CARE VISIT (OUTPATIENT)
Dept: SCHEDULING | Facility: HOME HEALTH | Age: 76
End: 2021-11-22
Payer: MEDICARE

## 2021-11-22 VITALS
TEMPERATURE: 97.8 F | HEART RATE: 85 BPM | DIASTOLIC BLOOD PRESSURE: 98 MMHG | OXYGEN SATURATION: 98 % | SYSTOLIC BLOOD PRESSURE: 164 MMHG

## 2021-11-22 PROCEDURE — 3331090001 HH PPS REVENUE CREDIT

## 2021-11-22 PROCEDURE — G0157 HHC PT ASSISTANT EA 15: HCPCS

## 2021-11-22 PROCEDURE — 3331090002 HH PPS REVENUE DEBIT

## 2021-11-22 PROCEDURE — G0158 HHC OT ASSISTANT EA 15: HCPCS

## 2021-11-22 NOTE — HOME HEALTH
S: No c/o pain, BP elevated 160/100, 164/98, pt does not take BP medications   Spouse states pt has an appointment with Aditi Koroma 12/8/2021 contacted office ISAIAS Sheldon is no longer at this pratice and pt does not have an appt with anyone else in the practice  pt is asymptomatic at this time.   patient and spouse instructed to monitor BP if BP increases or if patient becomes lightheaded, dizzy, SOB or develops blurred vision go to ER both verbalize understanding  Spouse instructed to find a PCP immediately   instructed pt to avoid foods high in sodium and rest  P:  Will FU tomorrow

## 2021-11-23 ENCOUNTER — HOME CARE VISIT (OUTPATIENT)
Dept: SCHEDULING | Facility: HOME HEALTH | Age: 76
End: 2021-11-23
Payer: MEDICARE

## 2021-11-23 VITALS
SYSTOLIC BLOOD PRESSURE: 163 MMHG | DIASTOLIC BLOOD PRESSURE: 91 MMHG | HEART RATE: 96 BPM | OXYGEN SATURATION: 97 % | TEMPERATURE: 97.8 F

## 2021-11-23 PROCEDURE — G0158 HHC OT ASSISTANT EA 15: HCPCS

## 2021-11-23 PROCEDURE — G0157 HHC PT ASSISTANT EA 15: HCPCS

## 2021-11-23 PROCEDURE — 3331090002 HH PPS REVENUE DEBIT

## 2021-11-23 PROCEDURE — 3331090001 HH PPS REVENUE CREDIT

## 2021-11-24 ENCOUNTER — HOSPITAL ENCOUNTER (EMERGENCY)
Age: 76
Discharge: HOME | End: 2021-11-24
Attending: STUDENT IN AN ORGANIZED HEALTH CARE EDUCATION/TRAINING PROGRAM
Payer: MEDICARE

## 2021-11-24 VITALS
WEIGHT: 140 LBS | RESPIRATION RATE: 18 BRPM | SYSTOLIC BLOOD PRESSURE: 175 MMHG | HEIGHT: 62 IN | TEMPERATURE: 97.9 F | HEART RATE: 78 BPM | BODY MASS INDEX: 25.76 KG/M2 | OXYGEN SATURATION: 98 % | DIASTOLIC BLOOD PRESSURE: 83 MMHG

## 2021-11-24 VITALS
SYSTOLIC BLOOD PRESSURE: 164 MMHG | HEART RATE: 85 BPM | TEMPERATURE: 98.8 F | OXYGEN SATURATION: 98 % | RESPIRATION RATE: 16 BRPM | DIASTOLIC BLOOD PRESSURE: 98 MMHG

## 2021-11-24 DIAGNOSIS — I10 PRIMARY HYPERTENSION: Primary | ICD-10-CM

## 2021-11-24 LAB
ALBUMIN SERPL-MCNC: 3.5 G/DL (ref 3.4–5)
ALBUMIN/GLOB SERPL: 0.8 {RATIO} (ref 0.8–1.7)
ALP SERPL-CCNC: 93 U/L (ref 45–117)
ALT SERPL-CCNC: 23 U/L (ref 13–56)
ANION GAP SERPL CALC-SCNC: 3 MMOL/L (ref 3–18)
AST SERPL-CCNC: 21 U/L (ref 10–38)
BASOPHILS # BLD: 0.1 K/UL (ref 0–0.1)
BASOPHILS NFR BLD: 1 % (ref 0–2)
BILIRUB SERPL-MCNC: 0.5 MG/DL (ref 0.2–1)
BUN SERPL-MCNC: 21 MG/DL (ref 7–18)
BUN/CREAT SERPL: 31 (ref 12–20)
CALCIUM SERPL-MCNC: 10.1 MG/DL (ref 8.5–10.1)
CHLORIDE SERPL-SCNC: 107 MMOL/L (ref 100–111)
CO2 SERPL-SCNC: 29 MMOL/L (ref 21–32)
CREAT SERPL-MCNC: 0.68 MG/DL (ref 0.6–1.3)
DIFFERENTIAL METHOD BLD: ABNORMAL
EOSINOPHIL # BLD: 0.2 K/UL (ref 0–0.4)
EOSINOPHIL NFR BLD: 3 % (ref 0–5)
ERYTHROCYTE [DISTWIDTH] IN BLOOD BY AUTOMATED COUNT: 14.5 % (ref 11.6–14.5)
GLOBULIN SER CALC-MCNC: 4.6 G/DL (ref 2–4)
GLUCOSE SERPL-MCNC: 94 MG/DL (ref 74–99)
HCT VFR BLD AUTO: 38.9 % (ref 35–45)
HGB BLD-MCNC: 12.1 G/DL (ref 12–16)
IMM GRANULOCYTES # BLD AUTO: 0 K/UL (ref 0–0.04)
IMM GRANULOCYTES NFR BLD AUTO: 1 % (ref 0–0.5)
LYMPHOCYTES # BLD: 1.3 K/UL (ref 0.9–3.6)
LYMPHOCYTES NFR BLD: 15 % (ref 21–52)
MCH RBC QN AUTO: 29.1 PG (ref 24–34)
MCHC RBC AUTO-ENTMCNC: 31.1 G/DL (ref 31–37)
MCV RBC AUTO: 93.5 FL (ref 78–100)
MONOCYTES # BLD: 0.6 K/UL (ref 0.05–1.2)
MONOCYTES NFR BLD: 7 % (ref 3–10)
NEUTS SEG # BLD: 6.5 K/UL (ref 1.8–8)
NEUTS SEG NFR BLD: 74 % (ref 40–73)
NRBC # BLD: 0 K/UL (ref 0–0.01)
NRBC BLD-RTO: 0 PER 100 WBC
PLATELET # BLD AUTO: 347 K/UL (ref 135–420)
PMV BLD AUTO: 9.4 FL (ref 9.2–11.8)
POTASSIUM SERPL-SCNC: 3.9 MMOL/L (ref 3.5–5.5)
PROT SERPL-MCNC: 8.1 G/DL (ref 6.4–8.2)
RBC # BLD AUTO: 4.16 M/UL (ref 4.2–5.3)
SODIUM SERPL-SCNC: 139 MMOL/L (ref 136–145)
TROPONIN I SERPL-MCNC: <0.02 NG/ML (ref 0–0.04)
WBC # BLD AUTO: 8.8 K/UL (ref 4.6–13.2)

## 2021-11-24 PROCEDURE — 3331090001 HH PPS REVENUE CREDIT

## 2021-11-24 PROCEDURE — 3331090002 HH PPS REVENUE DEBIT

## 2021-11-24 PROCEDURE — 85025 COMPLETE CBC W/AUTO DIFF WBC: CPT

## 2021-11-24 PROCEDURE — 80053 COMPREHEN METABOLIC PANEL: CPT

## 2021-11-24 PROCEDURE — 99282 EMERGENCY DEPT VISIT SF MDM: CPT

## 2021-11-24 PROCEDURE — 93005 ELECTROCARDIOGRAM TRACING: CPT

## 2021-11-24 PROCEDURE — 84484 ASSAY OF TROPONIN QUANT: CPT

## 2021-11-24 RX ORDER — AMLODIPINE BESYLATE 5 MG/1
5 TABLET ORAL DAILY
Qty: 30 TABLET | Refills: 0 | Status: SHIPPED | OUTPATIENT
Start: 2021-11-24 | End: 2021-12-16 | Stop reason: SDUPTHER

## 2021-11-24 RX ORDER — AMLODIPINE BESYLATE 5 MG/1
5 TABLET ORAL
Status: DISCONTINUED | OUTPATIENT
Start: 2021-11-24 | End: 2021-11-24 | Stop reason: HOSPADM

## 2021-11-24 NOTE — HOME HEALTH
S: Pt without complaints of pain, no changed in medications. Spouse was able to make an appointment Dr. Priya Sampson who will be her new PCP appointment is scheduled for 12/8/2021 @ 10:30. O:  see intervention  A:  Patient requires skilled PT for instruction in strengthening activities, balance, coordination as well as gait and transfer tng to increase independence and assist in return to prior level of function. HEP to improve strength and facilitate increased independence with functional mobility. Instruction with gait sequencing to facilitate increase in quality of gait. BP is elevated this morning pt is without symptoms and would like to attempt ambulation. Sit to stand transfer training from [facility] with Mod A provided, pt demonstrates improved body mechanics and decreased effort to stand. Pt instructed in ambulation on even surfaces with FWW pt requires assist to advance walker, able to amb about 5 ft decreased foot clearance and stride length. VCs to improve posture with minimal improvement. No symptoms of elevated BP.  Pt/Spouse instucted to monitor BP go to ER if BP increases or if pt develops Headache, SOB, change in vision,   Plan: Next visit will address gait, strength and transfers

## 2021-11-24 NOTE — ED TRIAGE NOTES
Per  the patient was recently discharged from rehab and having PT at home. Per  the last few visits the patient BP was reading high and per history the patient PT will not be restarted until her BP his treated. Pt states she is not an any medications for BP however there is a BP dx on one of her discharge summaries.   Pt denies any pain or any other complaints

## 2021-11-24 NOTE — ED PROVIDER NOTES
EMERGENCY DEPARTMENT HISTORY AND PHYSICAL EXAM      Date: 11/24/2021  Patient Name: Bear Martin    History of Presenting Illness     Chief Complaint   Patient presents with    Hypertension       History Provided By: Patient and Patient's     HPI: Bear Martin, 77 y.o. female PMHx significant for polio presents ambulatory to the ED. Pt reports recent surgical neck fracture of right humerus 8/2021 for which pt is currently receiving physical therapy multiple times a week. Pt states physical therapy takes her blood pressure at each visit and they have noted elevated blood pressure in the 170s and 180s for the past 2 visits. They recommend patient come to the ED.  also states patient was admitted to [facility] for urosepsis and then was discharged from a rehab facility on 10/12.  states on the papers pt was noted to have htn, but both patient and  state patient does not carry a previous diagnosis of hypertension. Patient does not take medication for hypertension. Patient denies CP, SOB, dizziness, blurred vision. There are no other complaints, changes, or physical findings at this time. PCP: Tia Meraz NP    No current facility-administered medications on file prior to encounter. Current Outpatient Medications on File Prior to Encounter   Medication Sig Dispense Refill    povidone-iodine (BETADINE SKIN CLEANSER EX) Apply 5 mL to affected area daily. apply to heel wound       menthol-zinc oxide (CALMOSEPTINE) 0.44-20.6 % oint Apply 1 g to affected area every eight (8) hours.  alendronate (FOSAMAX) 70 mg tablet Take 70 mg by mouth every seven (7) days.  glycerin, laxative, (FLEET) 1 gram/mL soln rectal solution Insert 1 Packet into rectum daily as needed (constipation). (Patient not taking: Reported on 10/20/2021)      bisacodyL (DULCOLAX) 10 mg supp Insert 10 mg into rectum daily as needed for Constipation.  (Patient not taking: Reported on 10/20/2021)      cyanocobalamin 1,000 mcg tablet Take 1,000 mcg by mouth daily. Past History     Past Medical History:  No past medical history on file. Past Surgical History:  No past surgical history on file. Family History:  No family history on file. Social History:  Social History     Tobacco Use    Smoking status: Never Smoker    Smokeless tobacco: Never Used   Substance Use Topics    Alcohol use: Not Currently    Drug use: Never       Allergies: Allergies   Allergen Reactions    Erythromycin Unknown (comments)         Review of Systems   Review of Systems   Constitutional: Negative for chills and fever. Respiratory: Negative for shortness of breath. Cardiovascular: Negative for chest pain. Gastrointestinal: Negative for abdominal pain, nausea and vomiting. Genitourinary: Negative for flank pain. Musculoskeletal: Negative for back pain and myalgias. Skin: Negative for color change, pallor, rash and wound. Neurological: Negative for dizziness, weakness and light-headedness. All other systems reviewed and are negative. Physical Exam   Physical Exam  Vitals and nursing note reviewed. Constitutional:       General: She is not in acute distress. Appearance: She is well-developed. Comments: Pt in NAD   HENT:      Head: Normocephalic and atraumatic. Eyes:      Conjunctiva/sclera: Conjunctivae normal.   Cardiovascular:      Rate and Rhythm: Normal rate and regular rhythm. Heart sounds: Normal heart sounds. Comments: No murmurs, rubs or gallops  No lower leg swelling b/l  Pulmonary:      Effort: Pulmonary effort is normal. No respiratory distress. Breath sounds: Normal breath sounds. Comments: Lungs CTA  Not working to breathe  Abdominal:      General: Bowel sounds are normal. There is no distension. Palpations: Abdomen is soft. Musculoskeletal:         General: Normal range of motion. Skin:     General: Skin is warm.       Findings: No rash. Neurological:      Mental Status: She is alert and oriented to person, place, and time. Psychiatric:         Behavior: Behavior normal.         Diagnostic Study Results     Labs -   No results found for this or any previous visit (from the past 12 hour(s)). Radiologic Studies -   No orders to display     CT Results  (Last 48 hours)    None        CXR Results  (Last 48 hours)    None          Medical Decision Making   I am the first provider for this patient. I reviewed the vital signs, available nursing notes, past medical history, past surgical history, family history and social history. Vital Signs-Reviewed the patient's vital signs. No data found. Records Reviewed: Nursing Notes and Old Medical Records    Provider Notes (Medical Decision Making):   DDx: HTN    76 yo F who presents with asymptomatic elevated BP. Patient denies history of hypertension. Does not take BP medication. EKG shows no acute ischemic changes with negative troponin and normal labs. Will start patient on low-dose BP medication and stressed importance of prompt PCP follow-up. At time of discharge, pt non-toxic appearing in NAD. Pt stable for prompt outpatient follow-up with PCP 1 to 2 days. Patient given strict instructions to return if symptoms worsen. ED Course:   Initial assessment performed. The patients presenting problems have been discussed, and they are in agreement with the care plan formulated and outlined with them. I have encouraged them to ask questions as they arise throughout their visit. Disposition:  Discussed lab and imaging results with pt along with dx and treatment plan. Discussed importance of PCP follow up. All questions answered. Pt voiced they understood. Return if sx worsen. PLAN:  1. Discharge Medication List as of 11/24/2021 12:28 PM      START taking these medications    Details   amLODIPine (Norvasc) 5 mg tablet Take 1 Tablet by mouth daily for 30 days. , Print, Disp-30 Tablet, R-0         CONTINUE these medications which have NOT CHANGED    Details   povidone-iodine (BETADINE SKIN CLEANSER EX) Apply 5 mL to affected area daily. apply to heel wound , Historical Med      menthol-zinc oxide (CALMOSEPTINE) 0.44-20.6 % oint Apply 1 g to affected area every eight (8) hours. , Historical Med      alendronate (FOSAMAX) 70 mg tablet Take 70 mg by mouth every seven (7) days. , Historical Med      glycerin, laxative, (FLEET) 1 gram/mL soln rectal solution Insert 1 Packet into rectum daily as needed (constipation). , Historical Med      bisacodyL (DULCOLAX) 10 mg supp Insert 10 mg into rectum daily as needed for Constipation. , Historical Med      cyanocobalamin 1,000 mcg tablet Take 1,000 mcg by mouth daily. , Historical Med           2. Follow-up Information     Follow up With Specialties Details Why Contact Info    Nakia Hatfield NP Nurse Practitioner Schedule an appointment as soon as possible for a visit in 1 day  [facility]  [id number] [facility] EMERGENCY DEPT Emergency Medicine  As needed, If symptoms worsen [address]  [phone]        Return to ED if worse     Diagnosis     Clinical Impression:   1. Primary hypertension        Attestations:    RIVKA More    Please note that this dictation was completed with Inbox[employer], the computer voice recognition software. Quite often unanticipated grammatical, syntax, homophones, and other interpretive errors are inadvertently transcribed by the computer software. Please disregard these errors. Please excuse any errors that have escaped final proofreading. Thank you.

## 2021-11-25 LAB
ATRIAL RATE: 75 BPM
CALCULATED P AXIS, ECG09: 71 DEGREES
CALCULATED R AXIS, ECG10: -2 DEGREES
CALCULATED T AXIS, ECG11: 27 DEGREES
DIAGNOSIS, 93000: NORMAL
P-R INTERVAL, ECG05: 170 MS
Q-T INTERVAL, ECG07: 408 MS
QRS DURATION, ECG06: 82 MS
QTC CALCULATION (BEZET), ECG08: 455 MS
VENTRICULAR RATE, ECG03: 75 BPM

## 2021-11-25 PROCEDURE — 3331090002 HH PPS REVENUE DEBIT

## 2021-11-25 PROCEDURE — 3331090001 HH PPS REVENUE CREDIT

## 2021-11-25 NOTE — HOME HEALTH
SUBJECTIVE: Patient blood pressure elevated on this visit. Denise Mckeon CAREGIVER INVOLVEMENT/ASSISTANCE NEEDED FOR: Patient  helps with All I/ADL's. Denise Mckeon HOME HEALTH SUPPLIES BY TYPE AND QUANTITY ORDERED/DELIVERED THIS VISIT INCLUDE: N/A          . OBJECTIVE:  See interventions. .         ASSESSMENT OF PROGRESS TOWARD GOALS: Patient blood pressure was elevated on this date. Patients intial blood pressure was 140/100 and 165/98blood pressure was recorded again number displaying 168/98. COE and LPTA  attempted to contact MD office to report vitials due to increased blood pressure. After contacting MD office the patient did not have the patient in for an new patient visit. Pt  was able to contact MD office on [address] and was able to s/u an new patietn appointment on 12/08/21. It is advised that patient  continue to access clients blood pressure. If blood pressure continue to increased patient  instrusted to take client to the ED. Patient amnulated appox 25 ft within the home before her blood pressure increased. .           CONTINUED NEED FOR THE FOLLOWING SKILLS: [facility] OT is medically necessary to address barriers of  reduced functional mobility, RUE ROM, LUE strength, and ADL function. These barriers limit pt's participation in ADLs safely and would benefit from continued skilled OT to maximize independence and reduce burden on caregiver. Denise Mckeon PLAN FOR NEXT VISIT: COE address transfers to and from [facility] while addressing functional mobilty with physical therapy. .         THE FOLLOWING DISCHARGE PLANNING WAS DISCUSSED WITH THE PATIENT/CAREGIVER: The following discharge planning was discussed with the pt/caregiver: 2w5 with plan to dc to home environment after all [facility] goals have been met.

## 2021-11-25 NOTE — HOME HEALTH
SUBJECTIVE: Patient blood pressure elevated on this visit. Megan Nicholas CAREGIVER INVOLVEMENT/ASSISTANCE NEEDED FOR: Patient  helps with All I/ADL's. Megan Nicholas HOME HEALTH SUPPLIES BY TYPE AND QUANTITY ORDERED/DELIVERED THIS VISIT INCLUDE: N/A          . OBJECTIVE:  See interventions. .         ASSESSMENT OF PROGRESS TOWARD GOALS: Patient blood pressure was elevated on this date. Patients intial blood pressure was 160/100,blood pressure was recorded again number displaying 168/98. COE and LPTA  attempted to contact MD office to report vitials due to increased blood pressure. After contacting MD office the patient did not have the patient in for an new patient visit. Pt  was able to contact MD office on [address] and was able to s/u an new patietn appointment on 12/08/21. It is advised that patient  continue to access clients blood pressure. If blood pressure continue to increased patient  instrusted to take client to the ED. Megan Nicholas CONTINUED NEED FOR THE FOLLOWING SKILLS: [facility] OT is medically necessary to address barriers of  reduced functional mobility, RUE ROM, LUE strength, and ADL function. These barriers limit pt's participation in ADLs safely and would benefit from continued skilled OT to maximize independence and reduce burden on caregiver. Megan Nicholas PLAN FOR NEXT VISIT: COE address transfers to and from [facility] while addressing functional mobilty with physical therapy. .         THE FOLLOWING DISCHARGE PLANNING WAS DISCUSSED WITH THE PATIENT/CAREGIVER: The following discharge planning was discussed with the pt/caregiver: 2w5 with plan to dc to home environment after all [facility] goals have been met.

## 2021-11-26 PROCEDURE — 3331090002 HH PPS REVENUE DEBIT

## 2021-11-26 PROCEDURE — 3331090001 HH PPS REVENUE CREDIT

## 2021-11-27 ENCOUNTER — HOME CARE VISIT (OUTPATIENT)
Dept: SCHEDULING | Facility: HOME HEALTH | Age: 76
End: 2021-11-27
Payer: MEDICARE

## 2021-11-27 PROCEDURE — 3331090002 HH PPS REVENUE DEBIT

## 2021-11-27 PROCEDURE — 3331090001 HH PPS REVENUE CREDIT

## 2021-11-27 PROCEDURE — G0299 HHS/HOSPICE OF RN EA 15 MIN: HCPCS

## 2021-11-28 PROCEDURE — 3331090002 HH PPS REVENUE DEBIT

## 2021-11-28 PROCEDURE — 3331090001 HH PPS REVENUE CREDIT

## 2021-11-29 ENCOUNTER — HOME CARE VISIT (OUTPATIENT)
Dept: SCHEDULING | Facility: HOME HEALTH | Age: 76
End: 2021-11-29
Payer: MEDICARE

## 2021-11-29 VITALS
TEMPERATURE: 98.7 F | DIASTOLIC BLOOD PRESSURE: 78 MMHG | RESPIRATION RATE: 18 BRPM | HEART RATE: 78 BPM | OXYGEN SATURATION: 98 % | SYSTOLIC BLOOD PRESSURE: 132 MMHG

## 2021-11-29 VITALS
SYSTOLIC BLOOD PRESSURE: 140 MMHG | HEART RATE: 78 BPM | DIASTOLIC BLOOD PRESSURE: 88 MMHG | OXYGEN SATURATION: 98 % | RESPIRATION RATE: 17 BRPM | TEMPERATURE: 97.5 F

## 2021-11-29 PROCEDURE — 3331090002 HH PPS REVENUE DEBIT

## 2021-11-29 PROCEDURE — G0158 HHC OT ASSISTANT EA 15: HCPCS

## 2021-11-29 PROCEDURE — 3331090001 HH PPS REVENUE CREDIT

## 2021-11-29 PROCEDURE — G0157 HHC PT ASSISTANT EA 15: HCPCS

## 2021-11-29 NOTE — HOME HEALTH
SUBJECTIVE: Patient stated she wanted to address [facility] due to cather being removed. Kd Tavarez CAREGIVER INVOLVEMENT/ASSISTANCE NEEDED FOR: Patient  helps with All I/ADL's. Kd Tavarez HOME HEALTH SUPPLIES BY TYPE AND QUANTITY ORDERED/DELIVERED THIS VISIT INCLUDE: N/A                    . OBJECTIVE:  See interventions. .                   ASSESSMENT OF PROGRESS TOWARD GOALS:Pt is making progress on OT POC. Pt demonstrated toilet transfers with CG/Min A  with use of 3 in 1 commode and safe handling techniques in order to increase functional mobility and safety in home environment. Pt will tolerate standing with counter support for at least 2 minutes in 30 secs, while performing LB dressing over bilateral hips to increase dressing independence. The pt able to verbalize and demo  energy conservation techniques when performing I/ADL tasks such as sitting down when performing bathing and dressing tasks. sitting down when performing meals, pacing onself when performing a functional activity to prevent exertion, allowing adequate periods of rest after a tasks, sitting down when performing LB dressing, Sitting down when performing energy conservation techniques with IND. Patient performed all Grooming tasks at w/c level with MOD I. .                     CONTINUED NEED FOR THE FOLLOWING SKILLS: [facility] OT is medically necessary to address barriers of  reduced functional mobility, RUE ROM, LUE strength, and ADL function. These barriers limit pt's participation in ADLs safely and would benefit from continued skilled OT to maximize independence and reduce burden on caregiver. Kd Tavarez PLAN FOR NEXT VISIT: COE address transfers to and from [facility] while addressing functional mobilty with physical therapy.                                        .                   THE FOLLOWING DISCHARGE PLANNING WAS DISCUSSED WITH THE PATIENT/CAREGIVER: The following discharge planning was discussed with the pt/caregiver: 2w5 with plan to dc to home environment after all [facility] goals have been met.

## 2021-11-30 PROCEDURE — 3331090002 HH PPS REVENUE DEBIT

## 2021-11-30 PROCEDURE — 3331090001 HH PPS REVENUE CREDIT

## 2021-11-30 NOTE — HOME HEALTH
Skilled reason for visit: htn, wound care and huerta management requiring huerta care/wound care. Caregiver involvement: Patient's cg is her . cg lives with patient and is available as needed for assistance with iadls, adls, meal prep, medication management, taking to md appointments. .    Medications reviewed and all medications are available in the home this visit, no changes  The following education was provided regarding medications, medication interactions, and look alike medications (specify): reviewed side effects, purposes, dosage, frequencies. Medications  are effective at this time. Home health supplies by type and quantity ordered/delivered this visit include: na    Patient education provided this visit: patient/cg instructed to monitor for edema/increase in edema, to elevate extremity when edema occurs and to notify md if edema exceeds normal limits for patient, none noted at this time. patient made aware to monitor for s/s of infection [increased swelling, increased redness around site, increased pain, foul smelling drainage, fever] aware who to report to/when. no s/s of infection noted. huerta catheter removed this week by urology, pt reporting no complications. reviewed cardiac diet- monitoring sodium intake, cholesterol and fat intake. patient aware to limit sodium, no added sodium to diet. reviewed foods to avoid, how to order foods when eating out, how to read nutrition labels and measure sodium, cholesterol and fat intake. reviewed low sodium diet- patient aware to limit sodium, no added sodium to diet. reviewed foods to avoid, how to order foods when eating out, how to read nutrition labels and measure sodium intake. discussed importance of monitoring blood pressure daily and recording for review, discussed hypertension, causes/long term effects of uncontrolled hypertension.  discussed fall precautions in detail- having lighted hallways, removing throw rugs, monitoring medication that may alter mental status. patient made aware to turn every 2 hours and to keep pressure off of bony prominences, to monitor for any pressure ulcer development/worsening. patient reporting her buttocks wound and heel wound have healed and she no longer has any wounds present, declines SN assessment at this time. patient no longer requires intervention and is ready for DC. pt denies any questions or concerns at this time. Skilled Care Performed this visit: education as above. Agency Progress toward goals: Patient was seen for wound care/catheter care. Pt remaining stable during [facility] services. Pt has PT/OT therapy services. Pt is meeting all nursing goals, wound has healed and huerta catheter has been removed and is ready for DC. Pt/cg aware of disease process, s/s to monitor for, who to report to/when. Patient/cg is able to verbalize understanding of all medications at this time: side effects, purposes, dosages, frequencies. SN reviewed in detail all medications with patient/cg. No issues noted at discharge. Patient is aware to follow up with PCP and other MD's. Management aware of DC and approved. Patient's Progress towards personal goals: pt reporting she is meeting her goals every day. Home exercise program: patient instructed to perform sob hep 4-5 x daily and prn for sob, to promote lung expansion. pt also encouraged to use ICS q 2 hours and to perform sob hep during therapy. Continued need for the following skills: Physical Therapy and Occupational Therapy    Plan for next visit: na    Patient and/or caregiver notified and agrees to changes in the Plan of Care N/A      The following discharge planning was discussed with the pt/caregiver: Patient is meeting all SN goals at this time and is ready for discipline discharge to other therapies. Patient is aware to follow up with PCP and to continue with other therapies. Opportunity for questions provided- none at this time.  Patient aware to refer any questions after DC to therapy/MD.

## 2021-11-30 NOTE — HOME HEALTH
S: Pt without complaints of pain, 1 new medication Amlodipine 5mg 1 tab daily     O:  see intervention  A:  Patient requires skilled PT for instruction in strengthening activities, balance, coordination as well as gait and transfer tng to increase independence and assist in return to prior level of function. HEP to improve strength and facilitate increased independence with functional mobility. Instruction with gait sequencing to facilitate increase in quality of gait. Pt able to return demonsteate amb x 10 ft x 3 attempts, improved sit to stand transfers from Mod-Min A. Pt instructed in core strengthening ex with use of Tband pt able to return demonstrate HEP with good technique. Pt is initial appointment with new PCP next week will request order for Ortho Consult for AFO for LLE to improve gait.   NE has been moved to 12/10/2021  [facility] has been signed

## 2021-12-01 ENCOUNTER — HOME CARE VISIT (OUTPATIENT)
Dept: SCHEDULING | Facility: HOME HEALTH | Age: 76
End: 2021-12-01
Payer: MEDICARE

## 2021-12-01 VITALS
TEMPERATURE: 97.5 F | HEART RATE: 77 BPM | SYSTOLIC BLOOD PRESSURE: 150 MMHG | DIASTOLIC BLOOD PRESSURE: 80 MMHG | OXYGEN SATURATION: 100 %

## 2021-12-01 VITALS
DIASTOLIC BLOOD PRESSURE: 78 MMHG | HEART RATE: 84 BPM | TEMPERATURE: 98.7 F | OXYGEN SATURATION: 98 % | RESPIRATION RATE: 14 BRPM | SYSTOLIC BLOOD PRESSURE: 132 MMHG

## 2021-12-01 PROCEDURE — 3331090002 HH PPS REVENUE DEBIT

## 2021-12-01 PROCEDURE — G0157 HHC PT ASSISTANT EA 15: HCPCS

## 2021-12-01 PROCEDURE — G0158 HHC OT ASSISTANT EA 15: HCPCS

## 2021-12-01 PROCEDURE — 3331090001 HH PPS REVENUE CREDIT

## 2021-12-01 NOTE — HOME HEALTH
BED MOBILITY: Pt. is Ind with all bed mobility  which demonstrates an improvement from the initial evaluation of  Max A. This allows for the patient to be functionally more independent. TRANSFERS: Pt. is  Min A with all transfers which demonstrates and improvement from the initial evaluation score of Max A. WC<>BSC transfers with CGA This allows the patient increased functional independence and mobility  GAIT/WC MOBILITY: Pt. is able to ambulate 35ft with FWW with CGA on even surfaces pt was nonambulatory at eval   Transfers:  max A for bed to chair, with slideboard AD. max cues and instruction needed for sequencing and coordination  Pt has progressed well all goals resolved at this time, pt is Ind with HEP. Pt has appt with new PCP next week and has been given request for Ortho Consult for WILL FLORES. PT DC is scheduled for next week.

## 2021-12-02 PROCEDURE — 3331090001 HH PPS REVENUE CREDIT

## 2021-12-02 PROCEDURE — 3331090002 HH PPS REVENUE DEBIT

## 2021-12-02 NOTE — HOME HEALTH
SUBJECTIVE: Patient stated she wanted to address [facility] due to cather being removed. David Rodney CAREGIVER INVOLVEMENT/ASSISTANCE NEEDED FOR: Patient  helps with All I/ADL's. David Rodney HOME HEALTH SUPPLIES BY TYPE AND QUANTITY ORDERED/DELIVERED THIS VISIT INCLUDE: N/A                                        . OBJECTIVE:  See interventions. .                                       ASSESSMENT OF PROGRESS TOWARD GOALS:Pt is making progress on OT POC. Pt demonstrated toilet transfers with CGA  with use of 3 in 1 commode and safe handling techniques in order to increase functional mobility and safety in home environment. Pt will tolerate standing with counter support for at least 2 minutes in 30 secs, while performing LB dressing over bilateral hips to increase dressing independence. The pt able to verbalize and demo  energy conservation techniques when performing I/ADL tasks such as sitting down when performing bathing and dressing tasks. sitting down when performing meals, pacing onself when performing a functional activity to prevent exertion, allowing adequate periods of rest after a tasks, sitting down when performing LB dressing, Sitting down when performing energy conservation techniques with IND. Patient performed all Grooming tasks at w/c level with MOD I. .                                         CONTINUED NEED FOR THE FOLLOWING SKILLS: [facility] OT is medically necessary to address barriers of  reduced functional mobility, RUE ROM, LUE strength, and ADL function. These barriers limit pt's participation in ADLs safely and would benefit from continued skilled OT to maximize independence and reduce burden on caregiver. .                                       PLAN FOR NEXT VISIT: OT Discharge                                                                             . THE FOLLOWING DISCHARGE PLANNING WAS DISCUSSED WITH THE PATIENT/CAREGIVER: The following discharge planning was discussed with the pt/caregiver: 2w5 with plan to dc to home environment after all [facility] goals have been met.

## 2021-12-03 PROCEDURE — 3331090001 HH PPS REVENUE CREDIT

## 2021-12-03 PROCEDURE — 3331090002 HH PPS REVENUE DEBIT

## 2021-12-04 PROCEDURE — 3331090002 HH PPS REVENUE DEBIT

## 2021-12-04 PROCEDURE — 3331090001 HH PPS REVENUE CREDIT

## 2021-12-05 PROCEDURE — 3331090001 HH PPS REVENUE CREDIT

## 2021-12-05 PROCEDURE — 3331090002 HH PPS REVENUE DEBIT

## 2021-12-06 ENCOUNTER — HOME CARE VISIT (OUTPATIENT)
Dept: SCHEDULING | Facility: HOME HEALTH | Age: 76
End: 2021-12-06
Payer: MEDICARE

## 2021-12-06 VITALS
HEART RATE: 87 BPM | SYSTOLIC BLOOD PRESSURE: 120 MMHG | TEMPERATURE: 98.7 F | RESPIRATION RATE: 17 BRPM | DIASTOLIC BLOOD PRESSURE: 86 MMHG | OXYGEN SATURATION: 98 %

## 2021-12-06 PROCEDURE — 3331090002 HH PPS REVENUE DEBIT

## 2021-12-06 PROCEDURE — 3331090001 HH PPS REVENUE CREDIT

## 2021-12-06 PROCEDURE — G0152 HHCP-SERV OF OT,EA 15 MIN: HCPCS

## 2021-12-06 NOTE — HOME HEALTH
Caregiver involvement:  Mrs. Marisa Antunez currently  in an one-story home by her self . The patients daughter provides daily emotional support and assistance with self care and mobility. Medications reconciled and all medications are available in the home this visit. The following education was provided regarding medications, medication interactions, and look a like medications: Continue as directed by MD.  Medications  are effective at this time. Home health supplies by type and quantity ordered/delivered this visit include: N/A   Patient education provided this visit:  Educated on importance of performing BUE exercise daily for continued strength and endurance. Educated client on the of continue use of energy conservation and purse-lip breathing to prevent exertion when engaging in daily tasks living . Progress toward goals: Mrs. Marisa Antunez has met all goals on OT POC. Patient has met this goal on OT POC addressing functional mobilty and function. Patient can tolerate standing on counter  with BUE support for at least 4 minutes in order to perform LB dressing over bilateral hips. Patient able to reach out of BLANCO to pull bottoms down for independence with por+/fair balance w/o no periods of LOB. Patient is making progress with goals on OT POC addressing ADL retraining. Pt is able to perform toileting activities, LB clothing management with mod assist using commode, adaptive compensation techniques, and energy conservation strategies with MOD I. Home exercise program: Continue with BUE HEP addressing AROM. Pt is to perform HEP that consist of AROM exercises such as Straight arm swings, Shoulder Shrugs, Shoulder rotation backwards and forwards, Flexion, extension)  to increase BUE strength,endurance,ROM and flexion to perform adls and iadls. HEP is to be performed 2-3x a day with rest breaks as needed, Completing each exercises 15 times each daily.  while seated    Continued need for the following skills: Nursing and Physical Therapy    The following discharge planning was discussed with the pt/caregiver: DC OT. Pt has reached maximal potential with self care and functional mobility in her home setting.   Caregiver/spouse

## 2021-12-06 NOTE — Clinical Note
Mrs. Rito Koyanagi was seen by skilled OT for 15 visits s/p recovery from diagnosis of Encounter Hypertensive heart disease with heart failure. Skilled OT goals were to maximize her safety and participation with self care, balance retraining and functional mobility in her home setting. Mrs. Laila Moreno has met all goals on OT POC. Patient has met this goal on OT POC addressing functional mobilty and function. Patient can tolerate standing on counter  with BUE support for at least 4 minutes in order to perform LB dressing over bilateral hips. Patient is  able to reach out of BLANCO to pull bottoms down for independence with por+/fair balance w/o no periods of LOB. Patient is making progress with goals on OT POC addressing ADL retraining. Pt is able to perform toileting activities, LB clothing management with mod assist using commode, adaptive compensation techniques, and energy conservation strategies with MOD I. Therapist suggests continued use of HEP for continued BUE strength and endurance to perform house-hold tasks in home-setting She has reached maximal potential wish skilled OT at this time. No further skilled OT is indicated at this time. MD office notified.  Thank you for your Referral!!!

## 2021-12-07 PROBLEM — I10 PRIMARY HYPERTENSION: Status: ACTIVE | Noted: 2021-12-07

## 2021-12-07 PROCEDURE — 3331090001 HH PPS REVENUE CREDIT

## 2021-12-07 PROCEDURE — 3331090002 HH PPS REVENUE DEBIT

## 2021-12-07 NOTE — PROGRESS NOTES
Pricilla Barrett is a 77 y.o. female is seen on 12/8/2021 for Establish Care (home health abd leg brace for left leg ) and Medication Refill (medication refill for Amlodipine 5mg.)    Assessment & Plan:     1. Primary hypertension  Assessment & Plan:  Recommend home BP monitoring, bring monitor to next appt  BP goal <130/80, continue regimen for now  Consider increasing amlodipine to 10 mg if BP goal not achieved at follow-up  2. History of poliomyelitis  -     REFERRAL TO ORTHOPEDICS  3. Left leg weakness  Comments:  Secondary to #2  Ortho referral given per PT recommendations for possible AFO device   May also consider podiatry consult if needed  Orders:  -     REFERRAL TO ORTHOPEDICS  4. Postmenopausal  -     DEXA BONE DENSITY STUDY AXIAL; Future  5. Need for hepatitis C screening test  -     HEPATITIS C AB; Future  6. Screen for colon cancer  -     OCCULT BLOOD IMMUNOASSAY,DIAGNOSTIC; Future  7. Screening for lipid disorders  -     LIPID PANEL; Future  8. Encounter to establish care    Follow-up and Dispositions    · Return in about 2 weeks (around 12/22/2021) for medicare wellness visit, blood pressure, lab results. Subjective:     HPI    Patient presents today to establish care as a new patient. She was seen and evaluated in the ER on 11/24/2021 for elevated blood pressure. She was recently discharged from rehab and was receiving PT at home after surgical neck fracture of right humerus on 08/2021. Denies any past hx of HTN, but history was noted in [facility] back in September. She was started on amlodipine 5 mg and advised to establish care with a PCP. BP yesterday was reportedly 385 systolic. Patient is also requesting a referral to ortho in order to get a LLE AFO, has a hx of polio. She has been getting around with a walker. She needs the AFO as recommended by PT in order to distribute the weight on her legs to make ambulation easier.       Preventive:  COVID-19 vac - will bring card  Flu vaccine- recommended  Pneumonia vaccine- previously received  Shingles vaccine- previously received  Last DEXA - ordered  Last Colonoscopy- reportedly normal 5 years ago, FIT ordered  Hep C screening - ordered  M[facility] - will schedule  [employer] activation - sent via email    Review of Systems   Constitutional: Negative for chills, fever and malaise/fatigue. Eyes: Negative for blurred vision and double vision. Respiratory: Negative for shortness of breath. Cardiovascular: Negative for chest pain. Gastrointestinal: Negative for nausea. Musculoskeletal: Positive for joint pain. Neurological: Positive for weakness. Negative for dizziness, tingling and headaches. Objective:   BP (!) 159/75 (BP 1 Location: Left upper arm, BP Patient Position: Sitting, BP Cuff Size: Adult)   Pulse 85   Temp 98.3 °F (36.8 °C) (Oral)   Resp 17   Ht 5' 2\" (1.575 m)   Wt 140 lb (63.5 kg)   SpO2 99%   BMI 25.61 kg/m²     Physical Exam  Vitals and nursing note reviewed. Constitutional:       General: She is not in acute distress. Appearance: She is not ill-appearing. HENT:      Head: Normocephalic and atraumatic. Cardiovascular:      Rate and Rhythm: Normal rate and regular rhythm. Heart sounds: No murmur heard. No friction rub. No gallop. Pulmonary:      Effort: Pulmonary effort is normal. No respiratory distress. Breath sounds: No wheezing, rhonchi or rales. Musculoskeletal:        Legs:    Skin:     General: Skin is warm and dry. Neurological:      General: No focal deficit present. Mental Status: She is alert and oriented to person, place, and time. Motor: Weakness (LLE (chronic from hx of polio) present. Psychiatric:         Mood and Affect: Mood normal.         Thought Content:  Thought content normal.         Judgment: Judgment normal.        Vinnie Callejas NP

## 2021-12-08 ENCOUNTER — OFFICE VISIT (OUTPATIENT)
Dept: FAMILY MEDICINE CLINIC | Age: 76
End: 2021-12-08
Payer: MEDICARE

## 2021-12-08 VITALS
HEART RATE: 85 BPM | TEMPERATURE: 98.3 F | BODY MASS INDEX: 25.76 KG/M2 | HEIGHT: 62 IN | RESPIRATION RATE: 17 BRPM | OXYGEN SATURATION: 99 % | WEIGHT: 140 LBS | DIASTOLIC BLOOD PRESSURE: 75 MMHG | SYSTOLIC BLOOD PRESSURE: 159 MMHG

## 2021-12-08 DIAGNOSIS — Z76.89 ENCOUNTER TO ESTABLISH CARE: ICD-10-CM

## 2021-12-08 DIAGNOSIS — Z86.12 HISTORY OF POLIOMYELITIS: ICD-10-CM

## 2021-12-08 DIAGNOSIS — Z78.0 POSTMENOPAUSAL: ICD-10-CM

## 2021-12-08 DIAGNOSIS — R29.898 LEFT LEG WEAKNESS: ICD-10-CM

## 2021-12-08 DIAGNOSIS — Z11.59 NEED FOR HEPATITIS C SCREENING TEST: ICD-10-CM

## 2021-12-08 DIAGNOSIS — I10 PRIMARY HYPERTENSION: Primary | ICD-10-CM

## 2021-12-08 DIAGNOSIS — Z12.11 SCREEN FOR COLON CANCER: ICD-10-CM

## 2021-12-08 DIAGNOSIS — Z13.220 SCREENING FOR LIPID DISORDERS: ICD-10-CM

## 2021-12-08 PROCEDURE — 1101F PT FALLS ASSESS-DOCD LE1/YR: CPT | Performed by: NURSE PRACTITIONER

## 2021-12-08 PROCEDURE — G8427 DOCREV CUR MEDS BY ELIG CLIN: HCPCS | Performed by: NURSE PRACTITIONER

## 2021-12-08 PROCEDURE — 1090F PRES/ABSN URINE INCON ASSESS: CPT | Performed by: NURSE PRACTITIONER

## 2021-12-08 PROCEDURE — 99204 OFFICE O/P NEW MOD 45 MIN: CPT | Performed by: NURSE PRACTITIONER

## 2021-12-08 PROCEDURE — G0463 HOSPITAL OUTPT CLINIC VISIT: HCPCS | Performed by: NURSE PRACTITIONER

## 2021-12-08 PROCEDURE — 3331090001 HH PPS REVENUE CREDIT

## 2021-12-08 PROCEDURE — G8432 DEP SCR NOT DOC, RNG: HCPCS | Performed by: NURSE PRACTITIONER

## 2021-12-08 PROCEDURE — G8536 NO DOC ELDER MAL SCRN: HCPCS | Performed by: NURSE PRACTITIONER

## 2021-12-08 PROCEDURE — 3331090002 HH PPS REVENUE DEBIT

## 2021-12-08 PROCEDURE — 3017F COLORECTAL CA SCREEN DOC REV: CPT | Performed by: NURSE PRACTITIONER

## 2021-12-08 PROCEDURE — G8400 PT W/DXA NO RESULTS DOC: HCPCS | Performed by: NURSE PRACTITIONER

## 2021-12-08 PROCEDURE — G8419 CALC BMI OUT NRM PARAM NOF/U: HCPCS | Performed by: NURSE PRACTITIONER

## 2021-12-08 NOTE — ASSESSMENT & PLAN NOTE
Recommend home BP monitoring, bring monitor to next appt  BP goal <130/80, continue regimen for now  Consider increasing amlodipine to 10 mg if BP goal not achieved at follow-up

## 2021-12-08 NOTE — PROGRESS NOTES
Beryle Castellani presents today for   Chief Complaint   Patient presents with    [facility]     home health abd leg brace for left leg     Medication Refill     medication refill for Amlodipine 5mg. Is someone accompanying this pt? Yes      Is the patient using any DME equipment during [address]? Does use walker at home     Depression Screening:  3 most recent PHQ Screens 12/8/2021   Little interest or pleasure in doing things Not at all   Feeling down, depressed, irritable, or hopeless Not at all   Total Score PHQ 2 0       Learning Assessment:  Learning Assessment 12/8/2021   PRIMARY LEARNER Patient   HIGHEST LEVEL OF EDUCATION - PRIMARY LEARNER  SOME COLLEGE   BARRIERS PRIMARY LEARNER NONE   CO-LEARNER CAREGIVER No   PRIMARY LANGUAGE ENGLISH   LEARNER PREFERENCE PRIMARY DEMONSTRATION   ANSWERED BY patient    RELATIONSHIP SELF       Fall Risk  Fall Risk Assessment, last 12 mths 12/8/2021   Able to walk? No       ADL  No flowsheet data found. Travel Screening:    Travel Screening     Question   Response    In the last month, have you been in contact with someone who was confirmed or suspected to have Coronavirus / COVID-19? No / Unsure    Have you had a COVID-19 viral test in the last 14 days? No    Do you have any of the following new or worsening symptoms? Have you traveled internationally or domestically in the last month? No      Travel History   Travel since 11/08/21    No documented travel since 11/08/21         Health Maintenance reviewed and discussed and ordered per Provider.     Health Maintenance Due   Topic Date Due    Hepatitis C Screening  Never done    COVID-19 Vaccine (1) Never done    Colorectal Cancer Screening Combo  Never done    Shingrix Vaccine Age 50> (1 of 2) Never done    Bone Densitometry (Dexa) Screening  Never done    Pneumococcal 65+ years (1 of 1 - PPSV23) Never done    DTaP/Tdap/Td series (2 - Td or Tdap) 10/23/2018    Flu Vaccine (1) Never done   [facility]  Never done   . Coordination of Care:  1. Have you been to the ER, urgent care clinic since your last visit? Hospitalized since your last visit? No recent hospital visit     2. Have you seen or consulted any other health care providers outside of the [address] [place] [address] since your last visit? Include any pap smears or colon screening.  No

## 2021-12-08 NOTE — PATIENT INSTRUCTIONS
DASH Diet: Care Instructions  Your Care Instructions     The DASH diet is an eating plan that can help lower your blood pressure. DASH stands for Dietary Approaches to Stop Hypertension. Hypertension is high blood pressure. The DASH diet focuses on eating foods that are high in calcium, potassium, and magnesium. These nutrients can lower blood pressure. The foods that are highest in these nutrients are fruits, vegetables, low-fat dairy products, nuts, seeds, and legumes. But taking calcium, potassium, and magnesium supplements instead of eating foods that are high in those nutrients does not have the same effect. The DASH diet also includes whole grains, fish, and poultry. The DASH diet is one of several lifestyle changes your doctor may recommend to lower your high blood pressure. Your doctor may also want you to decrease the amount of sodium in your diet. Lowering sodium while following the DASH diet can lower blood pressure even further than just the DASH diet alone. Follow-up care is a key part of your treatment and safety. Be sure to make and go to all appointments, and call your doctor if you are having problems. It's also a good idea to know your test results and keep a list of the medicines you take. How can you care for yourself at home? Following the DASH diet  · Eat 4 to 5 servings of fruit each day. A serving is 1 medium-sized piece of fruit, ½ cup chopped or canned fruit, 1/4 cup dried fruit, or 4 ounces (½ cup) of fruit juice. Choose fruit more often than fruit juice. · Eat 4 to 5 servings of vegetables each day. A serving is 1 cup of lettuce or raw leafy vegetables, ½ cup of chopped or cooked vegetables, or 4 ounces (½ cup) of vegetable juice. Choose vegetables more often than vegetable juice. · Get 2 to 3 servings of low-fat and fat-free dairy each day. A serving is 8 ounces of milk, 1 cup of yogurt, or 1 ½ ounces of cheese. · Eat 6 to 8 servings of grains each day.  A serving is 1 slice of bread, 1 ounce of dry cereal, or ½ cup of cooked rice, pasta, or cooked cereal. Try to choose whole-grain products as much as possible. · Limit lean meat, poultry, and fish to 2 servings each day. A serving is 3 ounces, about the size of a deck of cards. · Eat 4 to 5 servings of nuts, seeds, and legumes (cooked dried beans, lentils, and split peas) each week. A serving is 1/3 cup of nuts, 2 tablespoons of seeds, or ½ cup of cooked beans or peas. · Limit fats and oils to 2 to 3 servings each day. A serving is 1 teaspoon of vegetable oil or 2 tablespoons of salad dressing. · Limit sweets and added sugars to 5 servings or less a week. A serving is 1 tablespoon jelly or jam, ½ cup sorbet, or 1 cup of lemonade. · Eat less than 2,300 milligrams (mg) of sodium a day. If you limit your sodium to 1,500 mg a day, you can lower your blood pressure even more. · Be aware that all of these are the suggested number of servings for people who eat 1,800 to 2,000 calories a day. Your recommended number of servings may be different if you need more or fewer calories. Tips for success  · Start small. Do not try to make dramatic changes to your diet all at once. You might feel that you are missing out on your favorite foods and then be more likely to not follow the plan. Make small changes, and stick with them. Once those changes become habit, add a few more changes. · Try some of the following:  ? Make it a goal to eat a fruit or vegetable at every meal and at snacks. This will make it easy to get the recommended amount of fruits and vegetables each day. ? Try yogurt topped with fruit and nuts for a snack or healthy dessert. ? Add lettuce, tomato, cucumber, and onion to sandwiches. ? Combine a ready-made pizza crust with low-fat mozzarella cheese and lots of vegetable toppings. Try using tomatoes, squash, spinach, broccoli, carrots, cauliflower, and onions. ?  Have a variety of cut-up vegetables with a low-fat dip as an appetizer instead of chips and dip. ? Sprinkle sunflower seeds or chopped almonds over salads. Or try adding chopped walnuts or almonds to cooked vegetables. ? Try some vegetarian meals using beans and peas. Add garbanzo or kidney beans to salads. Make burritos and tacos with mashed auguste beans or black beans. Where can you learn more? Go to http://www.morton.com/  Enter H967 in the search box to learn more about \"DASH Diet: Care Instructions. \"  Current as of: April 29, 2021               Content Version: 13.0  © 0288-6879 [employer]. Care instructions adapted under license by [employer] (which disclaims liability or warranty for this information). If you have questions about a medical condition or this instruction, always ask your healthcare professional. [address] any warranty or liability for your use of this information.

## 2021-12-09 PROCEDURE — 3331090002 HH PPS REVENUE DEBIT

## 2021-12-09 PROCEDURE — 3331090001 HH PPS REVENUE CREDIT

## 2021-12-10 ENCOUNTER — HOME CARE VISIT (OUTPATIENT)
Dept: SCHEDULING | Facility: HOME HEALTH | Age: 76
End: 2021-12-10
Payer: MEDICARE

## 2021-12-10 VITALS
HEART RATE: 84 BPM | TEMPERATURE: 97.8 F | RESPIRATION RATE: 16 BRPM | OXYGEN SATURATION: 94 % | SYSTOLIC BLOOD PRESSURE: 124 MMHG | DIASTOLIC BLOOD PRESSURE: 82 MMHG

## 2021-12-10 PROCEDURE — 3331090001 HH PPS REVENUE CREDIT

## 2021-12-10 PROCEDURE — G0151 HHCP-SERV OF PT,EA 15 MIN: HCPCS

## 2021-12-10 PROCEDURE — 3331090002 HH PPS REVENUE DEBIT

## 2021-12-10 NOTE — HOME HEALTH
DC ACTIONS NARRATIVE  Pt. clinically discharged and documentation finalized for completion of PT discharge. Caregiver involvement: Pt  is primary caregiver at this time and has been assisting with medication management and medical appointments  Medications reconciled and all medications are available in the home this visit. The following education was provided regarding medications, medication interactions, and look a like medications: NA  Medications  are effective at this time. Home health supplies by type and quantity ordered/delivered this visit include: NA  Patient education provided this visit: safety with functional mobility  Current Functional Status and progress towards goals:  Strength: Pt. RLE strength is 3/5 which is an improvement from the initial evaluation strength of 2-/5. This allows the patient increased functional independence and mobility    BED MOBILITY: Pt. is SBA with all bed mobility which demonstrates an improvement from the initial evaluation of max A. This allows for the patient to be functionally more independent. TRANSFERS: Pt. is SBA with all transfers with slideboard which demonstrates and improvement from the initial evaluation score of max A with SPT. Sit to stand is mod/max A. This allows the patient increased functional independence and mobility    GAIT/WC MOBILITY: Pt. is able to ambulate 25'x2 with FWW with CGA which allows for increased functional mobility and independence   STAIRS: NT    BALANCE: Patient's standing balance is fair which is an improvement from the initial evaluation score of unable to stand. This allows the patient to be functionally more independent and have a decreased fall risk Progress toward goals: Patient has met all goals, see interventions for details. Home exercise program: Patient has been given a HEP and patient/caregiver understand the HEP.  Patient is doing the HEP 1/day as able  Continued need for the following skills:  BUBBA patient is final DC from PT today   The following discharge planning was discussed with the pt/caregiver: DC from agency

## 2021-12-10 NOTE — Clinical Note
Ms. Sole Mott has been clinically discharged and documentation finalized for completion of PT discharge. Caregiver involvement: Pt  is primary caregiver at this time and has been assisting with medication management and medical appointments  Medications reconciled and all medications are available in the home this visit. The following education was provided regarding medications, medication interactions, and look a like medications: NA  Medications  are effective at this time. Home health supplies by type and quantity ordered/delivered this visit include: NA  Patient education provided this visit: safety with functional mobility  Current Functional Status and progress towards goals:  Strength: Pt. RLE strength is 3/5 which is an improvement from the initial evaluation strength of 2-/5. This allows the patient increased functional independence and mobility    BED MOBILITY: Pt. is SBA with all bed mobility which demonstrates an improvement from the initial evaluation of max A. This allows for the patient to be functionally more independent. TRANSFERS: Pt. is SBA with all transfers with slideboard which demonstrates and improvement from the initial evaluation score of max A with SPT. Sit to stand is mod/max A. This allows the patient increased functional independence and mobility    GAIT/WC MOBILITY: Pt. is able to ambulate 25'x2 with FWW with CGA which allows for increased functional mobility and independence   STAIRS: NT    BALANCE: Patient's standing balance is fair which is an improvement from the initial evaluation score of unable to stand. This allows the patient to be functionally more independent and have a decreased fall risk Progress toward goals: Patient has met all goals, see interventions for details. Home exercise program: Patient has been given a HEP and patient/caregiver understand the HEP.  Patient is doing the HEP 1/day as able  Continued need for the following skills:  NA patient is final DC from PT today   The following discharge planning was discussed with the pt/caregiver: DC from agency    Thank you for your referral of this patient.     Sincerely,    Jesusita Lopez, CELESTINO  Physical Therapist

## 2021-12-16 RX ORDER — AMLODIPINE BESYLATE 5 MG/1
5 TABLET ORAL DAILY
Qty: 90 TABLET | Refills: 0 | Status: SHIPPED | OUTPATIENT
Start: 2021-12-16 | End: 2022-03-21 | Stop reason: SDUPTHER

## 2021-12-16 NOTE — TELEPHONE ENCOUNTER
Patient need a medication refill. She has an appointment to come back after her bone density test. Please advise     Requested Prescriptions     Pending Prescriptions Disp Refills    amLODIPine (Norvasc) 5 mg tablet 30 Tablet 0     Sig: Take 1 Tablet by mouth daily for 30 days.

## 2021-12-22 ENCOUNTER — OFFICE VISIT (OUTPATIENT)
Dept: ORTHOPEDIC SURGERY | Age: 76
End: 2021-12-22
Payer: MEDICARE

## 2021-12-22 VITALS
OXYGEN SATURATION: 99 % | WEIGHT: 140 LBS | TEMPERATURE: 97.5 F | BODY MASS INDEX: 25.76 KG/M2 | HEIGHT: 62 IN | HEART RATE: 90 BPM

## 2021-12-22 DIAGNOSIS — A80.9 POLIO: ICD-10-CM

## 2021-12-22 DIAGNOSIS — S42.291A HUMERAL HEAD FRACTURE, RIGHT, CLOSED, INITIAL ENCOUNTER: Primary | ICD-10-CM

## 2021-12-22 DIAGNOSIS — Z91.81 AT RISK FOR FALLS: ICD-10-CM

## 2021-12-22 DIAGNOSIS — R29.898 LEFT LEG WEAKNESS: ICD-10-CM

## 2021-12-22 PROCEDURE — G8419 CALC BMI OUT NRM PARAM NOF/U: HCPCS | Performed by: SPECIALIST

## 2021-12-22 PROCEDURE — 1101F PT FALLS ASSESS-DOCD LE1/YR: CPT | Performed by: SPECIALIST

## 2021-12-22 PROCEDURE — 1090F PRES/ABSN URINE INCON ASSESS: CPT | Performed by: SPECIALIST

## 2021-12-22 PROCEDURE — 99203 OFFICE O/P NEW LOW 30 MIN: CPT | Performed by: SPECIALIST

## 2021-12-22 PROCEDURE — 3017F COLORECTAL CA SCREEN DOC REV: CPT | Performed by: SPECIALIST

## 2021-12-22 PROCEDURE — G8427 DOCREV CUR MEDS BY ELIG CLIN: HCPCS | Performed by: SPECIALIST

## 2021-12-22 PROCEDURE — G8400 PT W/DXA NO RESULTS DOC: HCPCS | Performed by: SPECIALIST

## 2021-12-22 PROCEDURE — G8536 NO DOC ELDER MAL SCRN: HCPCS | Performed by: SPECIALIST

## 2021-12-22 PROCEDURE — G8432 DEP SCR NOT DOC, RNG: HCPCS | Performed by: SPECIALIST

## 2021-12-22 NOTE — PROGRESS NOTES
Patient: Rishi Galeano                MRN: [id number]       SSN: [id number]  YOB: 1945        AGE: 77 y.o. SEX: female    PCP: Kathi Ye NP  12/22/21    CC: LEFT LEG WEAKNESS    HISTORY:  Rishi Galeano is a 77 y.o. female who is seen for left leg weakness and instability. She says her leg has been affected by her polio in recent years. She sustained a left leg fracture several months ago. She still experiences left leg weakness and she can't even kick out her left leg. She is scared that she will fall. She has not been walking well as she's post polio. She fell and sustained and humeral head fracture in August. She was transported to [facility]. She was admitted to cardiology to rule out a MI due to elevated CPK and CPB levels. She completed home therapy but she still can't walk well and has been in a wheelchair. She had an epiphysis edesis and ankle fusion to improve leg length inequality in 2002 by Dr. Neeta Soria. 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 She lives with her  and bains doodle named Alejandra Stark in [place]. She has 2 sons -- one lives in [facility] and another is a [employer] officer. She also 2 grandsons and 2 granddaughters. Ms. Daya Duffy weighs 140 lbs and is 5'2\" tall.        No results found for: HBA1C, LJS7TTDP, IQE8SUDT, ZMN0IANM  Weight Metrics 12/22/2021 12/8/2021 11/24/2021 10/20/2021   Weight 140 lb 140 lb 140 lb 140 lb   BMI 25.61 kg/m2 25.61 kg/m2 25.61 kg/m2 25.61 kg/m2       Patient Active Problem List   Diagnosis Code    Primary hypertension I10     REVIEW OF SYSTEMS:    Constitutional Symptoms: Negative   Eyes: Negative   Ears, Nose, Throat and Mouth: Negative   Cardiovascular: Negative   Respiratory: Negative   Genitourinary: Per HPI   Gastrointestinal: Per HPI   Integumentary (Skin and/or Breast): Negative   Musculoskeletal: Per HPI   Endocrine/Rheumatologic: Negative   Neurological: Per HPI   Hematology/Lymphatic: Negative    Allergic/Immunologic: Negative   Phychiatric: Negative    Social History     Socioeconomic History    Marital status:      Spouse name: Not on file    Number of children: Not on file    Years of education: Not on file    Highest education level: Not on file   Occupational History    Not on file   Tobacco Use    Smoking status: Never Smoker    Smokeless tobacco: Never Used   Substance and Sexual Activity    Alcohol use: Not Currently    Drug use: Never    Sexual activity: Not on file   Other Topics Concern    Not on file   Social History Narrative    Not on file     Social Determinants of Health     Financial Resource Strain:     Difficulty of Paying Living Expenses: Not on file   Food Insecurity:     Worried About Running Out of Food in the Last Year: Not on file    Chasity of Food in the Last Year: Not on file   Transportation Needs:     Lack of Transportation (Medical): Not on file    Lack of Transportation (Non-Medical):  Not on file   Physical Activity:     Days of Exercise per Week: Not on file    Minutes of Exercise per Session: Not on file   Stress:     Feeling of Stress : Not on file   Social Connections:     Frequency of Communication with Friends and Family: Not on file    Frequency of Social Gatherings with Friends and Family: Not on file    Attends Yazidism Services: Not on file    Active Member of Clubs or Organizations: Not on file    Attends Club or Organization Meetings: Not on file    Marital Status: Not on file   Intimate Partner Violence:     Fear of Current or Ex-Partner: Not on file    Emotionally Abused: Not on file    Physically Abused: Not on file    Sexually Abused: Not on file   Housing Stability:     Unable to Pay for Housing in the Last Year: Not on file    Number of [place] in the Last Year: Not on file  Unstable Housing in the Last Year: Not on file      Allergies   Allergen Reactions    Erythromycin Unknown (comments)     Upsets stomach      Current Outpatient Medications   Medication Sig    amLODIPine (Norvasc) 5 mg tablet Take 1 Tablet by mouth daily.  povidone-iodine (BETADINE SKIN CLEANSER EX) Apply 5 mL to affected area daily. apply to heel wound     menthol-zinc oxide (CALMOSEPTINE) 0.44-20.6 % oint Apply 1 g to affected area every eight (8) hours.  alendronate (FOSAMAX) 70 mg tablet Take 70 mg by mouth every seven (7) days. (Patient not taking: Reported on 12/8/2021)    cyanocobalamin 1,000 mcg tablet Take 1,000 mcg by mouth daily. No current facility-administered medications for this visit. PHYSICAL EXAMINATION:  Visit Vitals  Pulse 90   Temp 97.5 °F (36.4 °C)   Ht 5' 2\" (1.575 m)   Wt 140 lb (63.5 kg)   SpO2 99%   BMI 25.61 kg/m²      ORTHO EXAMINATION:   L< R leg length discrepancy   In a wheelchair  No active extension     MRI LUMB SPINE 8/9/21  1. Acute/subacute L2 vertebral fracture with approximately 40% height loss and no significant retropulsion.   -No evidence of posterior longitudinal ligamentous disruption.   -No significant associated spinal canal stenosis. 2. Mild multilevel degenerative disc disease and facet arthritis.   -No high-grade spinal canal stenosis. Mild L3/L4 spinal canal stenosis. -Mild to moderate right L3/L4 and L4/L5 foraminal stenosis. Multilevel mild foraminal stenosis as above. 3. Grade 1 anterolisthesis of L4 on L5, on the basis of facet arthritis. RADIOGRAPHS:  XR RIGHT HIP 8/10/21 [place] AHF2  1. No acute findings in the right hip. 2. Linear lucency at the left femoral neck, medially and just above the lesser trochanter. Healing or subtle hairline nondisplaced fracture? IMPRESSION:      ICD-10-CM ICD-9-CM    1. Humeral head fracture, right, closed, initial encounter  S42.291A 812.09    2.  Left leg weakness  R29.898 729.89 REFERRAL TO PHYSICAL THERAPY      AMB SUPPLY ORDER   3. At risk for falls  Z91.81 V15.88 REFERRAL TO PHYSICAL THERAPY      AMB SUPPLY ORDER   4. Polio  A80.9 045.90 REFERRAL TO PHYSICAL THERAPY      AMB SUPPLY ORDER     PLAN:  Lightweight locking hinged knee brace ordered. She will start a brief course of outpatient physical therapy. She will follow up as needed.       Scribed by Milagro Hutson MD [address]) as dictated by Milagro Hutson MD

## 2021-12-23 ENCOUNTER — HOSPITAL ENCOUNTER (OUTPATIENT)
Dept: LAB | Age: 76
Discharge: HOME | End: 2021-12-23
Payer: MEDICARE

## 2021-12-23 ENCOUNTER — HOSPITAL ENCOUNTER (OUTPATIENT)
Dept: BONE DENSITY | Age: 76
Discharge: HOME | End: 2021-12-23
Attending: NURSE PRACTITIONER
Payer: MEDICARE

## 2021-12-23 DIAGNOSIS — Z13.220 SCREENING FOR LIPID DISORDERS: ICD-10-CM

## 2021-12-23 DIAGNOSIS — Z78.0 POSTMENOPAUSAL: ICD-10-CM

## 2021-12-23 DIAGNOSIS — Z12.11 SCREEN FOR COLON CANCER: ICD-10-CM

## 2021-12-23 DIAGNOSIS — Z11.59 NEED FOR HEPATITIS C SCREENING TEST: ICD-10-CM

## 2021-12-23 LAB
CHOLEST SERPL-MCNC: 282 MG/DL
HCV AB SER IA-ACNC: 1.08 INDEX
HCV AB SERPL QL IA: POSITIVE
HCV COMMENT,HCGAC: ABNORMAL
HDLC SERPL-MCNC: 71 MG/DL (ref 40–60)
HDLC SERPL: 4 {RATIO} (ref 0–5)
LDLC SERPL CALC-MCNC: 178.2 MG/DL (ref 0–100)
LIPID PROFILE,FLP: ABNORMAL
TRIGL SERPL-MCNC: 164 MG/DL (ref ?–150)
VLDLC SERPL CALC-MCNC: 32.8 MG/DL

## 2021-12-23 PROCEDURE — 77080 DXA BONE DENSITY AXIAL: CPT

## 2021-12-23 PROCEDURE — 80061 LIPID PANEL: CPT

## 2021-12-23 PROCEDURE — 36415 COLL VENOUS BLD VENIPUNCTURE: CPT

## 2021-12-23 PROCEDURE — 86803 HEPATITIS C AB TEST: CPT

## 2021-12-23 PROCEDURE — 82274 ASSAY TEST FOR BLOOD FECAL: CPT

## 2021-12-24 LAB — HEMOCCULT STL QL IA: NEGATIVE

## 2022-01-03 PROBLEM — E78.5 HYPERLIPIDEMIA LDL GOAL <100: Status: ACTIVE | Noted: 2022-01-03

## 2022-01-03 NOTE — ASSESSMENT & PLAN NOTE
BP slightly elevated, goal <130/80  Home BP readings reviewed today  Continue regimen for now, given normotensive readings at home

## 2022-01-03 NOTE — ASSESSMENT & PLAN NOTE
LDL elevated, discussed risk vs benefits of statin  Start atorvastatin 20 mg nightly  Discussed potential side effects to report  Recheck labs in 3 mos

## 2022-01-03 NOTE — PROGRESS NOTES
Chief Complaint   Patient presents with    Annual Wellness Visit    Cholesterol Problem    Labs     Assessment & Plan:     1. Primary hypertension  Assessment & Plan:  BP slightly elevated, goal <130/80  Home BP readings reviewed today  Continue regimen for now, given normotensive readings at home  Orders:  -     METABOLIC PANEL, COMPREHENSIVE; Future  2. Hyperlipidemia LDL goal <100  Assessment & Plan:  LDL elevated, discussed risk vs benefits of statin  Start atorvastatin 20 mg nightly  Discussed potential side effects to report  Recheck labs in 3 mos  Orders:  -     LIPID PANEL; Future  -     METABOLIC PANEL, COMPREHENSIVE; Future  -     atorvastatin (LIPITOR) 20 mg tablet; Take 1 Tablet by mouth daily. , Normal, Disp-90 Tablet, R-0  3. Positive hepatitis C antibody test  Assessment & Plan:  Consult GI, given contact information for scheduling  Orders:  -     REFERRAL TO GASTROENTEROLOGY  4. Osteopenia of multiple sites  Assessment & Plan:  May hold Fosamax for now and repeat DEXA in 2 years  Recommend Calcium with Vitamin D3 OTC daily  5. Needs flu shot  -     FLU (FLUAD QUAD INFLUENZA VACCINE,QUAD,ADJUVANTED)  6. Encounter to discuss test results    Follow-up and Dispositions    · Return in about 3 months (around 4/4/2022) for cholesterol, blood pressure, lab results. Subjective:     HPI    Patient presents today for [address], lab results, accompanied by  Milagros White. Hypertension-  Symptoms:  None  BP readings at home are 120s to 130s  Comorbid:  HLD  Key CAD CHF Meds             atorvastatin (LIPITOR) 20 mg tablet (Taking) Take 1 Tablet by mouth daily. amLODIPine (Norvasc) 5 mg tablet (Taking) Take 1 Tablet by mouth daily.          Hyperlipidemia  Treatment:  None  Comorbid:  HTN    Hep C -  Previous hx:  None  Symptoms:  None   reports previous exposure to Hep C    Health Maintenance:  COVID-19 vac - card scanned today  Flu vaccine- given today  Pneumonia vaccine- previously received  Shingles vaccine- previously received  MWV - done today    Review of Systems   Constitutional: Negative for chills, fever and malaise/fatigue. Respiratory: Negative for shortness of breath. Cardiovascular: Negative for chest pain. Gastrointestinal: Negative for abdominal pain, nausea and vomiting. Neurological: Positive for weakness (chronic). Negative for dizziness, tingling and headaches. Objective:   BP (!) 144/81   Pulse 77   Resp 16   Ht 5' 2\" (1.575 m)   Wt 140 lb (63.5 kg)   SpO2 (!) 75%   BMI 25.61 kg/m²      Physical Exam  Vitals and nursing note reviewed. Constitutional:       General: She is not in acute distress. Appearance: She is not ill-appearing. HENT:      Head: Normocephalic and atraumatic. Cardiovascular:      Rate and Rhythm: Normal rate and regular rhythm. Heart sounds: No murmur heard. No friction rub. No gallop. Pulmonary:      Effort: Pulmonary effort is normal. No respiratory distress. Breath sounds: No wheezing, rhonchi or rales. Musculoskeletal:      Comments: LLE weakness (chronic)   Skin:     General: Skin is warm and dry. Neurological:      General: No focal deficit present. Mental Status: She is alert and oriented to person, place, and time. Psychiatric:         Mood and Affect: Mood normal.         Thought Content:  Thought content normal.         Judgment: Judgment normal.        CONY Scott

## 2022-01-04 ENCOUNTER — OFFICE VISIT (OUTPATIENT)
Dept: FAMILY MEDICINE CLINIC | Age: 77
End: 2022-01-04
Payer: MEDICARE

## 2022-01-04 VITALS
WEIGHT: 140 LBS | RESPIRATION RATE: 16 BRPM | DIASTOLIC BLOOD PRESSURE: 81 MMHG | SYSTOLIC BLOOD PRESSURE: 144 MMHG | OXYGEN SATURATION: 99 % | HEIGHT: 62 IN | BODY MASS INDEX: 25.76 KG/M2 | HEART RATE: 77 BPM

## 2022-01-04 DIAGNOSIS — Z23 NEEDS FLU SHOT: ICD-10-CM

## 2022-01-04 DIAGNOSIS — R76.8 POSITIVE HEPATITIS C ANTIBODY TEST: ICD-10-CM

## 2022-01-04 DIAGNOSIS — Z00.00 MEDICARE ANNUAL WELLNESS VISIT, SUBSEQUENT: Primary | ICD-10-CM

## 2022-01-04 DIAGNOSIS — Z71.89 ACP (ADVANCE CARE PLANNING): ICD-10-CM

## 2022-01-04 DIAGNOSIS — M85.89 OSTEOPENIA OF MULTIPLE SITES: ICD-10-CM
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
## 2022-01-04 DIAGNOSIS — E78.5 HYPERLIPIDEMIA LDL GOAL <100: ICD-10-CM

## 2022-01-04 DIAGNOSIS — Z71.2 ENCOUNTER TO DISCUSS TEST RESULTS: ICD-10-CM

## 2022-01-04 DIAGNOSIS — I10 PRIMARY HYPERTENSION: ICD-10-CM

## 2022-01-04 PROCEDURE — G8536 NO DOC ELDER MAL SCRN: HCPCS | Performed by: NURSE PRACTITIONER

## 2022-01-04 PROCEDURE — G8754 DIAS BP LESS 90: HCPCS | Performed by: NURSE PRACTITIONER

## 2022-01-04 PROCEDURE — G8427 DOCREV CUR MEDS BY ELIG CLIN: HCPCS | Performed by: NURSE PRACTITIONER

## 2022-01-04 PROCEDURE — 3017F COLORECTAL CA SCREEN DOC REV: CPT | Performed by: NURSE PRACTITIONER

## 2022-01-04 PROCEDURE — G8432 DEP SCR NOT DOC, RNG: HCPCS | Performed by: NURSE PRACTITIONER

## 2022-01-04 PROCEDURE — G8399 PT W/DXA RESULTS DOCUMENT: HCPCS | Performed by: NURSE PRACTITIONER

## 2022-01-04 PROCEDURE — 1101F PT FALLS ASSESS-DOCD LE1/YR: CPT | Performed by: NURSE PRACTITIONER

## 2022-01-04 PROCEDURE — 99497 ADVNCD CARE PLAN 30 MIN: CPT | Performed by: NURSE PRACTITIONER

## 2022-01-04 PROCEDURE — G0008 ADMIN INFLUENZA VIRUS VAC: HCPCS | Performed by: NURSE PRACTITIONER

## 2022-01-04 PROCEDURE — 90694 VACC AIIV4 NO PRSRV 0.5ML IM: CPT | Performed by: NURSE PRACTITIONER

## 2022-01-04 PROCEDURE — G0439 PPPS, SUBSEQ VISIT: HCPCS | Performed by: NURSE PRACTITIONER

## 2022-01-04 PROCEDURE — 1090F PRES/ABSN URINE INCON ASSESS: CPT | Performed by: NURSE PRACTITIONER

## 2022-01-04 PROCEDURE — G8419 CALC BMI OUT NRM PARAM NOF/U: HCPCS | Performed by: NURSE PRACTITIONER

## 2022-01-04 PROCEDURE — 99214 OFFICE O/P EST MOD 30 MIN: CPT | Performed by: NURSE PRACTITIONER

## 2022-01-04 PROCEDURE — G8753 SYS BP > OR = 140: HCPCS | Performed by: NURSE PRACTITIONER

## 2022-01-04 RX ORDER — ATORVASTATIN CALCIUM 20 MG/1
20 TABLET, FILM COATED ORAL DAILY
Qty: 90 TABLET | Refills: 0 | Status: SHIPPED | OUTPATIENT
Start: 2022-01-04 | End: 2022-03-21 | Stop reason: SDUPTHER

## 2022-01-04 NOTE — PATIENT INSTRUCTIONS
Medicare Wellness Visit, Female     The best way to live healthy is to have a lifestyle where you eat a well-balanced diet, exercise regularly, limit alcohol use, and quit all forms of tobacco/nicotine, if applicable. Regular preventive services are another way to keep healthy. Preventive services (vaccines, screening tests, monitoring & exams) can help personalize your care plan, which helps you manage your own care. Screening tests can find health problems at the earliest stages, when they are easiest to treat. [facility] follows the current, evidence-based guidelines published by the [facility] Esquivel ([facility]) when recommending preventive services for our patients. Because we follow these guidelines, sometimes recommendations change over time as research supports it. (For example, mammograms used to be recommended annually. Even though Medicare will still pay for an annual mammogram, the newer guidelines recommend a mammogram every two years for women of average risk). Of course, you and your doctor may decide to screen more often for some diseases, based on your risk and your co-morbidities (chronic disease you are already diagnosed with). Preventive services for you include:  - Medicare offers their members a free annual wellness visit, which is time for you and your primary care provider to discuss and plan for your preventive service needs. Take advantage of this benefit every year!  -All adults over the age of 72 should receive the recommended pneumonia vaccines. Current USPSTF guidelines recommend a series of two vaccines for the best pneumonia protection.   -All adults should have a flu vaccine yearly and a tetanus vaccine every 10 years.   -All adults age 48 and older should receive the shingles vaccines (series of two vaccines).       -All adults age 38-68 who are overweight should have a diabetes screening test once every three years.   -All adults born between [birth year] and 1965 should be screened once for Hepatitis C.  -Other screening tests and preventive services for persons with diabetes include: an eye exam to screen for diabetic retinopathy, a kidney function test, a foot exam, and stricter control over your cholesterol.   -Cardiovascular screening for adults with routine risk involves an electrocardiogram (ECG) at intervals determined by your doctor.   -Colorectal cancer screenings should be done for adults age 54-65 with no increased risk factors for colorectal cancer. There are a number of acceptable methods of screening for this type of cancer. Each test has its own benefits and drawbacks. Discuss with your doctor what is most appropriate for you during your annual wellness visit. The different tests include: colonoscopy (considered the best screening method), a fecal occult blood test, a fecal DNA test, and sigmoidoscopy.    -A bone mass density test is recommended when a woman turns 65 to screen for osteoporosis. This test is only recommended one time, as a screening. Some providers will use this same test as a disease monitoring tool if you already have osteoporosis. -Breast cancer screenings are recommended every other year for women of normal risk, age 54-69.  -Cervical cancer screenings for women over age 72 are only recommended with certain risk factors. Here is a list of your current Health Maintenance items (your personalized list of preventive services) with a due date:  Health Maintenance Due   Topic Date Due    COVID-19 Vaccine (1) Never done    Shingles Vaccine (1 of 2) Never done    Pneumococcal Vaccine (1 of 1 - PPSV23) Never done    Yearly Flu Vaccine (1) Never done    Annual Well Visit  Never done       REMINDERS:    1.  calcium with Vitamin D3 over the counter and start taking daily  2. Please call GI for the hepatitis C consult at [phone] by the end of the week to schedule  3.   Start taking atorvastatin 20 mg at night for cholesterol  4. We will repeat your fasting labs in 3 mos, please complete 1-2 weeks prior to April appointment.

## 2022-01-04 NOTE — PROGRESS NOTES
This is an Initial Medicare Annual Wellness Exam (AWV) (Performed 12 months after IPPE or effective date of Medicare Part B enrollment, Once in a lifetime)    I have reviewed the patient's medical history in detail and updated the computerized patient record. Assessment/Plan   Education and counseling provided:  Are appropriate based on today's review and evaluation  End-of-Life planning (with patient's consent)  Influenza Vaccine    1. Medicare annual wellness visit, subsequent  2. ACP (advance care planning)  3. Needs flu shot  -     FLU (FLUAD QUAD INFLUENZA VACCINE,QUAD,ADJUVANTED)  4. Fall, subsequent encounter   - Fall precautions advised    Follow-up and Dispositions    · Return in about 3 months (around 4/4/2022) for cholesterol, blood pressure, lab results. Depression Risk Factor Screening     3 most recent PHQ Screens 1/4/2022   Little interest or pleasure in doing things Not at all   Feeling down, depressed, irritable, or hopeless Not at all   Total Score PHQ 2 0       Alcohol & Drug Abuse Risk Screen    Do you average more than 1 drink per night or more than 7 drinks a week:  No    On any one occasion in the past three months have you have had more than 3 drinks containing alcohol:  No          Functional Ability and Level of Safety    Hearing: Hearing is good. Activities of Daily Living: The home contains: no safety equipment. Patient does total self care     Ambulation: with no difficulty      Fall Risk:  Fall Risk Assessment, last 12 mths 1/4/2022   Able to walk? Yes   Fall in past 12 months? 1   Do you feel unsteady? 0   Are you worried about falling 1   Is TUG test greater than 12 seconds? 0   Is the gait abnormal? 1   Number of falls in past 12 months 1   Fall with injury? 0   Had a fall during the first week of August.  Got up out of bed around 3 in the morning, was not fully awake.   She ended up with a dislocation of right shoulder as well as compression of spine, did not have to have surgery. Abuse Screen:  Patient is not abused     Cognitive Screening    Has your family/caregiver stated any concerns about your memory: no     Health Maintenance Due     Health Maintenance Due   Topic Date Due    Shingrix Vaccine Age 49> (1 of 2) Never done    Pneumococcal 65+ years (1 of 1 - PPSV23) Never done    COVID-19 Vaccine (3 - Booster for Mackey Peter series) 10/09/2021    Medicare Yearly Exam  Never done       Patient Care Team   Patient Care Team:  Maritaz Rodriguez NP as PCP - General (Nurse Practitioner)  Maritza Rodriguez [facility] as [facility]CP [facility]    History     Patient Active Problem List   Diagnosis Code    Primary hypertension I10    Hyperlipidemia LDL goal <100 E78.5    Positive hepatitis C antibody test R76.8    Osteopenia of multiple sites M85.89     Past Medical History:   Diagnosis Date    Left leg weakness       History reviewed. No pertinent surgical history. Current Outpatient Medications   Medication Sig Dispense Refill    atorvastatin (LIPITOR) 20 mg tablet Take 1 Tablet by mouth daily. 90 Tablet 0    amLODIPine (Norvasc) 5 mg tablet Take 1 Tablet by mouth daily. 90 Tablet 0     Allergies   Allergen Reactions    Erythromycin Unknown (comments)     Upsets stomach       History reviewed. No pertinent family history.   Social History     Tobacco Use    Smoking status: Never Smoker    Smokeless tobacco: Never Used   Substance Use Topics    Alcohol use: Not Currently       CONY Lam

## 2022-01-04 NOTE — PROGRESS NOTES
Will discuss results with patient at next scheduled appointment on 01/04/2022.   Referred to GI for positive Hep C

## 2022-01-04 NOTE — PROGRESS NOTES
Obtained consent from patient. Per verbal order from NP Yamila Injection of High Flu Dose Vaccine administered. Verified by me  that this is the correct immunization/injection. Patient observed for 15 minutes with no adverse reaction.

## 2022-01-04 NOTE — PROGRESS NOTES
Ebony Gleason presents today for   Chief Complaint   Patient presents with    Annual Wellness Visit    Cholesterol Problem    Labs       Is someone accompanying this pt? Yes      Is the patient using any DME equipment during [address]? Yes walker     Depression Screening:  3 most recent PHQ Screens 1/4/2022   Little interest or pleasure in doing things Not at all   Feeling down, depressed, irritable, or hopeless Not at all   Total Score PHQ 2 0       Learning Assessment:  Learning Assessment 12/8/2021   PRIMARY LEARNER Patient   HIGHEST LEVEL OF EDUCATION - PRIMARY LEARNER  SOME COLLEGE   BARRIERS PRIMARY LEARNER NONE   CO-LEARNER CAREGIVER No   PRIMARY LANGUAGE ENGLISH   LEARNER PREFERENCE PRIMARY DEMONSTRATION   ANSWERED BY patient    RELATIONSHIP SELF       Fall Risk  Fall Risk Assessment, last 12 mths 1/4/2022   Able to walk? Yes   Fall in past 12 months? 1   Do you feel unsteady? 0   Are you worried about falling 1   Is TUG test greater than 12 seconds? 0   Is the gait abnormal? 1   Number of falls in past 12 months 1   Fall with injury? 0       ADL  No flowsheet data found. Travel Screening:    Travel Screening     Question   Response    In the last month, have you been in contact with someone who was confirmed or suspected to have Coronavirus / COVID-19? No / Unsure    Have you had a COVID-19 viral test in the last 14 days? No    Do you have any of the following new or worsening symptoms? Have you traveled internationally or domestically in the last month? No      Travel History   Travel since 12/04/21    No documented travel since 12/04/21         Health Maintenance reviewed and discussed and ordered per Provider. Health Maintenance Due   Topic Date Due    COVID-19 Vaccine (1) Never done    Shingrix Vaccine Age 50> (1 of 2) Never done    Pneumococcal 65+ years (1 of 1 - PPSV23) Never done    Flu Vaccine (1) Never done    Medicare Yearly Exam  Never done   .       Coordination of Care:  1. Have you been to the ER, urgent care clinic since your last visit? Hospitalized since your last visit? No     2. Have you seen or consulted any other health care providers outside of the [address] [place] [address] since your last visit? Include any pap smears or colon screening.  No

## 2022-01-04 NOTE — PROGRESS NOTES
Advance Care Planning     Advance Care Planning (ACP) Physician/NP/PA Conversation      Date of Conversation: 1/4/2022  Conducted with: Patient with Decision Making Capacity    Healthcare Decision Maker:     Primary Decision Maker: Audi Jorge - Liss - [phone]  Click here to complete [employer] including selection of the Healthcare Decision Maker Relationship (ie \"Primary\")  Today we documented Decision Maker(s). The patient will provide ACP documents. Care Preferences:    Hospitalization: \"If your health worsens and it becomes clear that your chance of recovery is unlikely, what would be your preference regarding hospitalization? \"  The patient would prefer comfort-focused treatment without hospitalization. Ventilation: \"If you were unable to breathe on your own and your chance of recovery was unlikely, what would be your preference about the use of a ventilator (breathing machine) if it was available to you? \"   The patient would NOT desire the use of a ventilator. Resuscitation: \"In the event your heart stopped as a result of an underlying serious health condition, would you want attempts to be made to restart your heart, or would you prefer a natural death? \"   Yes, attempt to resuscitate.       Conversation Outcomes / Follow-Up Plan:   ACP in process - completing/providing documents     Length of Voluntary ACP Conversation in minutes:  16 minutes    Dwaine Scherer NP

## 2022-02-25 ENCOUNTER — TELEPHONE (OUTPATIENT)
Dept: FAMILY MEDICINE CLINIC | Age: 77
End: 2022-02-25

## 2022-02-25 NOTE — TELEPHONE ENCOUNTER
Patient said that she called physical therapy to get schedule and they said that they need a new referral to .  Please advise

## 2022-02-27 NOTE — TELEPHONE ENCOUNTER
Please call patient and find out exactly what is needed. It looks like Dr. Ruchi Tyson recommended PT, which he already ordered. She should not need another referral for Dr. Ruchi Tyson since she is an established patient there. Thank you.

## 2022-02-28 NOTE — TELEPHONE ENCOUNTER
Spoke with patient and she states that she was told that another referral was needed. Patient was informed that Dr. Carmen Velazquez wrote the PT order. Patient was given number to call the office.

## 2022-03-08 ENCOUNTER — HOSPITAL ENCOUNTER (OUTPATIENT)
Dept: PHYSICAL THERAPY | Age: 77
Discharge: HOME | End: 2022-03-08
Attending: SPECIALIST
Payer: MEDICARE

## 2022-03-08 PROCEDURE — 97530 THERAPEUTIC ACTIVITIES: CPT | Performed by: PHYSICAL THERAPIST

## 2022-03-08 PROCEDURE — 97110 THERAPEUTIC EXERCISES: CPT | Performed by: PHYSICAL THERAPIST

## 2022-03-08 PROCEDURE — 97162 PT EVAL MOD COMPLEX 30 MIN: CPT | Performed by: PHYSICAL THERAPIST

## 2022-03-08 PROCEDURE — 97116 GAIT TRAINING THERAPY: CPT | Performed by: PHYSICAL THERAPIST

## 2022-03-08 NOTE — PROGRESS NOTES
PT DAILY TREATMENT NOTE     Patient Name: Lesli Bell  Date:3/8/2022  : 1945  [x]  Patient  Verified  Payor: Radha Leyva / Plan: [place] MED[place]ARE PART [place] & B / Product Type: Medicare /    In time:1:36P  Out time:2:35P  Total Treatment Time (min): 59min  Visit #: 1 of 12    Medicare/BCBS Only   Total Timed Codes (min):  45 1:1 Treatment Time:  59       Treatment Area: Other displaced fracture of upper end of right humerus, initial encounter for closed fracture [S42.291A]  Other symptoms and signs involving the musculoskeletal system [R29.898]  History of falling [Z91.81]  Acute poliomyelitis, unspecified [A80.9]    SUBJECTIVE  Pain Level (0-10 scale): 0/10  Any medication changes, allergies to medications, adverse drug reactions, diagnosis change, or new procedure performed?: [x] No    [] Yes (see summary sheet for update)  Subjective functional status/changes:   [] No changes reported    Lives with spouse in [facility] home with 4 steps to enter with bilateral handrails, 12 steps to second floor. Has a wheelchair, walker, cane, ramp to enter the home, walk in shower upstairs, grab bars. Currently able to dress self but needs help in and out of the bathroom. MHx: Polio with progressive weakening over last 10 years. Fall precipitated need for the Wheelchair and installation of a ramp. OBJECTIVE    15 min [x]Eval                  []Re-Eval     14 min Therapeutic Activity:  [x]  See flow sheet :   Rationale: increase strength and improve coordination  to improve the patients ability to Tolerate basic ADLs and job-related tasks without pain. 15 min Neuromuscular Re-education:  [x]  See flow sheet :   Rationale: improve coordination, improve balance and increase proprioception  to improve the patients ability to perform activities with good form and proprioception with tactile and verbal cuing appropriately.     15 min Gait Training:  _5x2__ feet with __parallel bars_ device on level surfaces with _CGA__ level of assist   Rationale: increase confidence with use of brace          With   [] TE   [x] TA   [x] neuro   [] other: Patient Education: [x] Review HEP    [x] Progressed/Changed HEP based on:   [x] positioning   [] body mechanics   [] transfers   [] heat/ice application    [] other:      Other Objective/Functional Measures:     5 Sit to stands with arms: 2 min     Needs Max A for donning brace    CGA to SBA for sit to stands to parallel bars. Mod I with wheelchair management    Posture: severe forward trunk lean. Pain Level (0-10 scale) post treatment: 0/10    ASSESSMENT/Changes in Function: see POC    Patient will continue to benefit from skilled PT services to modify and progress therapeutic interventions, address functional mobility deficits, address ROM deficits, address strength deficits, analyze and address soft tissue restrictions, analyze and cue movement patterns, analyze and modify body mechanics/ergonomics, assess and modify postural abnormalities, address imbalance/dizziness and instruct in home and community integration to attain remaining goals.      [x]  See Plan of Care  []  See progress note/recertification  []  See Discharge Summary         Progress towards goals / Updated goals:  See POC    PLAN  [x]  Upgrade activities as tolerated     []  Continue plan of care  []  Update interventions per flow sheet       []  Discharge due to:_  []  Other:_      Citlaly Altman, PT 3/8/2022  2:02 PM    Future Appointments   Date Time Provider Tabitha Araujo   4/5/2022  1:00 PM Raisa Chairez NP [facility]

## 2022-03-08 NOTE — PROGRESS NOTES
[facility] [address] [id number] [facility], [address] [place] [address], [facility] [address]  [phone] fax      Plan of Care/ Statement of Necessity for Physical Therapy Services    Patient name: Nae Nixon Start of Care: 3/8/2022   Referral source: Ema Phillips MD : 1945    Medical Diagnosis: Other displaced fracture of upper end of right humerus, initial encounter for closed fracture [S42.291A]  Other symptoms and signs involving the musculoskeletal system [R29.898]  History of falling [Z91.81]  Acute poliomyelitis, unspecified [A80.9]  Payor: [place] MEDICARE / Plan: VA MEDICARE PART A & B / Product Type: Medicare /  Onset Date:2021    Treatment Diagnosis: Left Leg Weakness   Prior Hospitalization: see medical history Provider#: [id number]   Medications: Verified on Patient summary List    Comorbidities: Polio survivor, HBP, visual impairment, high cholestrol   Prior Level of Function: Mod I with ADLs, requiring a RW for transfers, gait in the home and community, able to climb up and down stairs with RW. The Plan of Care and following information is based on the information from the initial evaluation. Assessment/ key information: Patient is a pleasant older adult female with sub-acute on chronic Left Leg weakness in the presence of polio, status post a fall in 2021 that caused a fracture of the right shoulder and inherent limitation of RW use for several months. Now has a brace on the Left leg to assist with return to pre-fall levels of functional mobility independence as much as possible. Evaluation Complexity History HIGH Complexity :3+ comorbidities / personal factors will impact the outcome/ POC ; Examination LOW Complexity : 1-2 Standardized tests and measures addressing body structure, function, activity limitation and / or participation in recreation  ;Presentation MEDIUM Complexity : Evolving with changing characteristics  ; Clinical Decision Making Other outcome measures 5 times sit to stand Test  HIGH   Overall Complexity Rating: MEDIUM  Problem List: pain affecting function, decrease ROM, decrease strength, edema affecting function, impaired gait/ balance, decrease ADL/ functional abilitiies, decrease activity tolerance, decrease flexibility/ joint mobility and decrease transfer abilities   Treatment Plan may include any combination of the following: Therapeutic exercise, Therapeutic activities, Neuromuscular re-education, Physical agent/modality, Gait/balance training, Manual therapy, Patient education, Self Care training, Functional mobility training, Home safety training, Stair training and Other: HEP  Patient / Family readiness to learn indicated by: asking questions, trying to perform skills and interest  Persons(s) to be included in education: patient (P)  Barriers to Learning/Limitations: None  Patient Goal (s): I want to be able to walk myself to the bathroom again  Patient Self Reported Health Status: good  Rehabilitation Potential: good    Short Term Goals: To be accomplished in 3 weeks:  1. Patient will be I with HEP for carryover to home management               Eval: established  2. Patient will be able to ambulate a total of 25 ft with brace and parallel bars to facilitate functional independence in the home. Eval: 5 ft  Long Term Goals: To be accomplished in 5 weeks:  1. Patient will be able to perform 5 sit to stands from the chair with use of arms in 45 secs or less to facilitate ease with transfers while wearing the brace               Eval: 3 min  2. Patient will be able to ambulate 50ft with a rolling walker and CGA or less to facilitate ability to ambulate in the home without assistance. Eval: 5 ft in parallel bars. Frequency / Duration: Patient to be seen 2 times per week for 5 weeks.     Patient/ Caregiver education and instruction: Diagnosis, prognosis, self care, activity modification, exercises and other HEP   [x] Plan of care has been reviewed with PTA    Certification Period: 3/8/22 - 4/6/22  Tatianna Angelo, PT 3/8/2022 2:00 PM    ________________________________________________________________________    I certify that the above Therapy Services are being furnished while the patient is under my care. I agree with the treatment plan and certify that this therapy is necessary.     [de-identified] Signature:____________Date:_________TIME:________     Lacey Jauregui MD  ** Signature, Date and Time must be completed for valid certification **    Please sign and return to In [address] [place] [address] [address]  [address] [place] [address], [address] [place] [address], [id number] [address],[facility] [address]  [phone] fax

## 2022-03-10 ENCOUNTER — HOSPITAL ENCOUNTER (OUTPATIENT)
Dept: PHYSICAL THERAPY | Age: 77
Discharge: HOME | End: 2022-03-10
Attending: SPECIALIST
Payer: MEDICARE

## 2022-03-10 PROCEDURE — 97112 NEUROMUSCULAR REEDUCATION: CPT | Performed by: PHYSICAL THERAPIST

## 2022-03-10 PROCEDURE — 97110 THERAPEUTIC EXERCISES: CPT | Performed by: PHYSICAL THERAPIST

## 2022-03-10 PROCEDURE — 97530 THERAPEUTIC ACTIVITIES: CPT | Performed by: PHYSICAL THERAPIST

## 2022-03-10 PROCEDURE — 97116 GAIT TRAINING THERAPY: CPT | Performed by: PHYSICAL THERAPIST

## 2022-03-10 NOTE — PROGRESS NOTES
PT DAILY TREATMENT NOTE     Patient Name: Clayton Grijalva  Date:3/10/2022  : 1945  [x]  Patient  Verified  Payor: Dick Mejia / Plan: [place] & [place] / Product Type: Medicare /    In time:1210P  Out time:1250P  Total Treatment Time (min): 40min  Visit #: 2 of 12    Medicare/BCBS Only   Total Timed Codes (min):  40 1:1 Treatment Time:  40       Treatment Area: Other displaced fracture of upper end of right humerus, initial encounter for closed fracture [S42.291A]  Other symptoms and signs involving the musculoskeletal system [R29.898]  History of falling [Z91.81]  Acute poliomyelitis, unspecified [A80.9]    SUBJECTIVE  Pain Level (0-10 scale): 0/10  Any medication changes, allergies to medications, adverse drug reactions, diagnosis change, or new procedure performed?: [x] No    [] Yes (see summary sheet for update)  Subjective functional status/changes:   [] No changes reported  Patient states she is very excited, continues to be blessed she can walk a little bit again. OBJECTIVE    14 min Therapeutic Activity:  [x]? See flow sheet :   Rationale: increase strength and improve coordination  to improve the patients ability to Tolerate basic ADLs and job-related tasks without pain. 15 min Neuromuscular Re-education:  [x]? See flow sheet :   Rationale: improve coordination, improve balance and increase proprioception  to improve the patients ability to perform activities with good form and proprioception with tactile and verbal cuing appropriately.      15 min Gait Training:  _50x1, 5ft x4__ feet with __parallel bars_ device on level surfaces with _CGA__ level of assist   Rationale: increase confidence with use of brace                                                                  With   [] TE   [] TA   [] neuro   [] other: Patient Education: [x] Review HEP    [] Progressed/Changed HEP based on:   [] positioning   [] body mechanics   [] transfers   [] heat/ice application    [] other: Other Objective/Functional Measures: Able to demonstrate mod I with use of brace about 75% of the time. Had one loss of balance with transferring to the table   Pain Level (0-10 scale) post treatment: 0/10    ASSESSMENT/Changes in Function: Patient is progressing towards goals of increased activity tolerance and decreased risk for falls. Able to manage moving her leg around in the brace on the table independently. Anticipate her being able to use the brace safely at home after one more session of use in the clinic. Patient will continue to benefit from skilled PT services to modify and progress therapeutic interventions, address functional mobility deficits, address ROM deficits, address strength deficits, analyze and address soft tissue restrictions, analyze and cue movement patterns, analyze and modify body mechanics/ergonomics, assess and modify postural abnormalities, address imbalance/dizziness and instruct in home and community integration to attain remaining goals. [x]  See Plan of Care  []  See progress note/recertification  []  See Discharge Summary         Progress towards goals / Updated goals:    Short Term Goals: To be accomplished in 3 weeks:  1. Patient will be I with HEP for carryover to home management               Eval: established   Current: reports some compliance - progressing  2. Patient will be able to ambulate a total of 25 ft with brace and parallel bars to facilitate functional independence in the home. Eval: 5 ft   Current: MET at 50ft this visit  Long Term Goals: To be accomplished in 5 weeks:  1. Patient will be able to perform 5 sit to stands from the chair with use of arms in 45 secs or less to facilitate ease with transfers while wearing the brace               Eval: 3 min  2. Patient will be able to ambulate 50ft with a rolling walker and CGA or less to facilitate ability to ambulate in the home without assistance. Eval: 5 ft in parallel bars. PLAN  [x]  Upgrade activities as tolerated     []  Continue plan of care  []  Update interventions per flow sheet       []  Discharge due to:_  []  Other:_      Nasima Wong, PT 3/10/2022  2:53 PM    Future Appointments   Date Time Provider Tabitha Araujo   3/15/2022 12:00 PM Shahrzad Hurst [facility]   3/17/2022  3:00 PM Zahida Blanco PTA [facility]   3/22/2022 12:00 PM Remer Mcburney, PT [facility]   3/24/2022 12:00 PM Kartik Maloney, PTA [facility]   3/29/2022  1:30 PM Kartik Maloney, PRABHA [facility]   3/31/2022 12:00 PM Kartik Maloney, PRABHA [facility]   4/5/2022  1:00 PM Maude Castillo NP [facility]

## 2022-03-15 ENCOUNTER — HOSPITAL ENCOUNTER (OUTPATIENT)
Dept: PHYSICAL THERAPY | Age: 77
Discharge: HOME | End: 2022-03-15
Attending: SPECIALIST
Payer: MEDICARE

## 2022-03-15 PROCEDURE — 97116 GAIT TRAINING THERAPY: CPT

## 2022-03-15 PROCEDURE — 97530 THERAPEUTIC ACTIVITIES: CPT

## 2022-03-15 PROCEDURE — 97112 NEUROMUSCULAR REEDUCATION: CPT

## 2022-03-15 NOTE — PROGRESS NOTES
PT DAILY TREATMENT NOTE     Patient Name: Delia Hernandez  Date:3/15/2022  : 1945  [x]  Patient  Verified  Payor: Ron Laureano / Plan: [place] MEDICARE PART A & B / Product Type: Medicare /    In time:1200  Out time:1243  Total Treatment Time (min): 43  Visit #: 3 of 12    Medicare/BCBS Only   Total Timed Codes (min):  43 1:1 Treatment Time:  43       Treatment Area: Other displaced fracture of upper end of right humerus, initial encounter for closed fracture [S42.291A]  Other symptoms and signs involving the musculoskeletal system [R29.898]  History of falling [Z91.81]  Acute poliomyelitis, unspecified [A80.9]    SUBJECTIVE  Pain Level (0-10 scale): 0  Any medication changes, allergies to medications, adverse drug reactions, diagnosis change, or new procedure performed?: [x] No    [] Yes (see summary sheet for update)  Subjective functional status/changes:   [] No changes reported  Pt states she is getting more comfortable with wearing left leg brace    OBJECTIVE    8 min Therapeutic Activity:  [x]  See flow sheet :   Rationale: increase ROM, increase strength, improve coordination, improve balance and increase proprioception  to improve the patients ability to perform ADL's safely     20 min Neuromuscular Re-education:  [x]  See flow sheet :   Rationale: increase ROM, increase strength, improve coordination, improve balance and increase proprioception  to improve the patients ability to perform ADL's safely    15 min Gait Training: 15ft x2__ feet with __parallel bars_ device on level surfaces with _CGA__ level of assist   Rationale: With   [] TE   [] TA   [] neuro   [] other: Patient Education: [x] Review HEP    [] Progressed/Changed HEP based on:   [] positioning   [] body mechanics   [] transfers   [] heat/ice application    [] other:      Other Objective/Functional Measures:   Pt required verbal and tactile cues for proper form during step taps.   CGA with transfers with no LOB noted     Pain Level (0-10 scale) post treatment: 0    ASSESSMENT/Changes in Function: Pt making slow but steady progress towards goals. Pt required frequent curing for proper sequencing during gait at parallel bars. Patient will continue to benefit from skilled PT services to modify and progress therapeutic interventions, address functional mobility deficits, address ROM deficits, address strength deficits, analyze and address soft tissue restrictions, analyze and cue movement patterns, analyze and modify body mechanics/ergonomics, assess and modify postural abnormalities and address imbalance/dizziness to attain remaining goals. []  See Plan of Care  []  See progress note/recertification  []  See Discharge Summary         Progress towards goals / Updated goals:  Short Term Goals: To be accomplished in 3 weeks:  1. Patient will be I with HEP for carryover to home management               Eval: established              Current: reports some compliance - progressing  2. Patient will be able to ambulate a total of 25 ft with brace and parallel bars to facilitate functional independence in the home.               Eval: 5 ft              Current: MET at 50ft this visit  [address] [facility] [place] [address] be accomplished in 5 weeks:  1. Patient will be able to perform 5 sit to stands from the chair with use of arms in 45 secs or less to facilitate ease with transfers while wearing the brace               Eval: 3 min  2. Patient will be able to ambulate 50ft with a rolling walker and CGA or less to facilitate ability to ambulate in the home without assistance.               Eval: 5 ft in parallel bars.      PLAN  []  Upgrade activities as tolerated     [x]  Continue plan of care  []  Update interventions per flow sheet       []  Discharge due to:_  []  Other:_      Maria Isabel Shannon PTA 3/15/2022  9:25 AM    Future Appointments   Date Time Provider Tabitha Araujo   3[facility]15[facility]2022 12:00 [facility]   3/17/2022  3:00 PM Jeffery Sue, PTA [facility]   3/22/2022 12:00 PM Tu Wilson, PT [facility]   3/24/2022 12:00 PM Marbin Scott, PTA [facility]   3/29/2022  1:30 PM Marbin Scott, PTA [facility]   3/31/2022 12:00 PM Marbin Scott, PTA [facility]   4/5/2022  1:00 PM Hector Cook NP [facility]

## 2022-03-17 ENCOUNTER — HOSPITAL ENCOUNTER (OUTPATIENT)
Dept: PHYSICAL THERAPY | Age: 77
Discharge: HOME | End: 2022-03-17
Attending: SPECIALIST
Payer: MEDICARE

## 2022-03-17 PROCEDURE — 97530 THERAPEUTIC ACTIVITIES: CPT

## 2022-03-17 PROCEDURE — 97110 THERAPEUTIC EXERCISES: CPT

## 2022-03-17 PROCEDURE — 97116 GAIT TRAINING THERAPY: CPT

## 2022-03-17 NOTE — PROGRESS NOTES
PT DAILY TREATMENT NOTE     Patient Name: Eddie Wright  Date:3/17/2022  : 1945  [x]  Patient  Verified  Payor: Artem Cloud / Plan: [place] MEDICARE PART A & B / Product Type: Medicare /    In time: 2:58 Out time:3:47  Total Treatment Time (min): 49  Visit #: 4 of 12    Medicare/BCBS Only   Total Timed Codes (min):  49 1:1 Treatment Time:  49       Treatment Area: Other displaced fracture of upper end of right humerus, initial encounter for closed fracture [S42.291A]  Other symptoms and signs involving the musculoskeletal system [R29.898]  History of falling [Z91.81]  Acute poliomyelitis, unspecified [A80.9]    SUBJECTIVE  Pain Level (0-10 scale): 0  Any medication changes, allergies to medications, adverse drug reactions, diagnosis change, or new procedure performed?: [x] No    [] Yes (see summary sheet for update)  Subjective functional status/changes:   [] No changes reported  \"No pain. \"    OBJECTIVE    Modality rationale: decrease edema, decrease inflammation, decrease pain, increase tissue extensibility and increase muscle contraction/control to improve the patients ability to perform ADL    Min Type Additional Details    [] Estim:  []Unatt       []IFC  []Premod                        []Other:  []w/ice   []w/heat  Position:  Location:    [] Estim: []Att    []TENS instruct  []NMES                    []Other:  []w/US   []w/ice   []w/heat  Position:  Location:    []  Traction: [] Cervical       []Lumbar                       [] Prone          []Supine                       []Intermittent   []Continuous Lbs:  [] before manual  [] after manual    []  Ultrasound: []Continuous   [] Pulsed                           []1MHz   []3MHz W/cm2:  Location:    []  Iontophoresis with dexamethasone         Location: [] Take home patch   [] In clinic    []  Ice     []  heat  []  Ice massage  []  Laser   []  Anodyne Position:  Location:    []  Laser with stim  []  Other:  Position:  Location:    []  Vasopneumatic Device []  Right     []  Left  Pre-treatment girth:  Post-treatment girth:  Measured at (location):  Pressure:       [] lo [] med [] hi   Temperature: [] lo [] med [] hi   [x] Skin assessment post-treatment:  [x]intact []redness- no adverse reaction    []redness  adverse reaction:      min []Eval                  []Re-Eval       14 min Therapeutic Exercise:  [x] See flow sheet :   Rationale: increase ROM, increase strength and improve coordination to improve the patients ability to perform ADL     15 min Therapeutic Activity:  [x]  See flow sheet :   Rationale: increase ROM, increase strength and improve coordination  to improve the patients ability to perform ADL       min Neuromuscular Re-education:  []  See flow sheet :   Rationale: increase ROM, increase strength, improve coordination, improve balance and increase proprioception  to improve the patients ability to perform ADL      min Manual Therapy: The manual therapy interventions were performed at a separate and distinct time from the therapeutic activities interventions. Rationale: decrease pain, increase ROM, increase tissue extensibility, decrease edema , decrease trigger points and increase postural awareness to perform ADL     20 min Gait Training:  _4x 8ft  In parallell bar CG_ and 20ft with RW CG with ___  on level surfaces with ___ level of assist   Rationale: With   [] TE   [] TA   [] neuro   [] other: Patient Education: [x] Review HEP    [] Progressed/Changed HEP based on:   [] positioning   [] body mechanics   [] transfers   [] heat/ice application    [] other:      Other Objective/Functional Measures:      Pain Level (0-10 scale) post treatment: 0    ASSESSMENT/Changes in Function: therapist attempt to have pt to  Ride on Nustep pt was unable due to weakness at left LE. Pt was able to ambulate with RW CG 20ft. Pt is making some gains with PT.      Patient will continue to benefit from skilled PT services to address functional mobility deficits, address ROM deficits, address strength deficits, analyze and address soft tissue restrictions, analyze and cue movement patterns, analyze and modify body mechanics/ergonomics, assess and modify postural abnormalities and instruct in home and community integration to attain remaining goals. [x]  See Plan of Care  []  See progress note/recertification  []  See Discharge Summary         Progress towards goals / Updated goals:   1. Patient will be I with HEP for carryover to home management               Eval: established              TFXJEWD: reports some compliance - progressing  2. Patient will be able to ambulate a total of 25 ft with brace and parallel bars to facilitate functional independence in the home.               Eval: 5 ft              Current: MET at 50ft this visit  [address] [facility] [address] be accomplished in 5 weeks:  1. Patient will be able to perform 5 sit to stands from the chair with use of arms in 45 secs or less to facilitate ease with transfers while wearing the brace               Eval: 3 min    Current:  10 not timed 3/17/22  2.  Patient will be able to ambulate 50ft with a rolling walker and CGA or less to facilitate ability to ambulate in the home without assistance.               Eval: 5 ft in parallel bars.   PLAN  []  Upgrade activities as tolerated     [x]  Continue plan of care  []  Update interventions per flow sheet       []  Discharge due to:_  []  Other:_      Suleiman Bartholomew PTA 3/17/2022  3:12 PM    Future Appointments   Date Time Provider Tabitha Araujo   3/22/2022 12:00 PM Paola Rodriguez, PT [facility]   3/24/2022 12:00 PM Bebeto Books, PTA [facility]   3/29/2022  1:30 PM Omie Books, PTA [facility]   3/31/2022 12:00 PM Bebeto Books, PTA [facility]   4/5/2022  1:00 PM Jan Godwin NP [facility]

## 2022-03-18 PROBLEM — E78.5 HYPERLIPIDEMIA LDL GOAL <100: Status: ACTIVE | Noted: 2022-01-03

## 2022-03-19 PROBLEM — I10 PRIMARY HYPERTENSION: Status: ACTIVE | Noted: 2021-12-07

## 2022-03-19 PROBLEM — M85.89 OSTEOPENIA OF MULTIPLE SITES: Status: ACTIVE | Noted: 2022-01-04

## 2022-03-19 PROBLEM — R76.8 POSITIVE HEPATITIS C ANTIBODY TEST: Status: ACTIVE | Noted: 2022-01-04

## 2022-03-21 DIAGNOSIS — E78.5 HYPERLIPIDEMIA LDL GOAL <100: ICD-10-CM

## 2022-03-21 RX ORDER — ATORVASTATIN CALCIUM 20 MG/1
20 TABLET, FILM COATED ORAL DAILY
Qty: 90 TABLET | Refills: 0 | Status: SHIPPED | OUTPATIENT
Start: 2022-03-21 | End: 2022-05-03 | Stop reason: SDUPTHER

## 2022-03-21 RX ORDER — AMLODIPINE BESYLATE 5 MG/1
5 TABLET ORAL DAILY
Qty: 90 TABLET | Refills: 0 | Status: SHIPPED
Start: 2022-03-21 | End: 2022-04-05 | Stop reason: DRUGHIGH

## 2022-03-21 NOTE — TELEPHONE ENCOUNTER
Patient need a medication refill. Please advise     Requested Prescriptions     Pending Prescriptions Disp Refills    atorvastatin (LIPITOR) 20 mg tablet 90 Tablet 0     Sig: Take 1 Tablet by mouth daily.  amLODIPine (Norvasc) 5 mg tablet 90 Tablet 0     Sig: Take 1 Tablet by mouth daily.

## 2022-03-22 ENCOUNTER — HOSPITAL ENCOUNTER (OUTPATIENT)
Dept: PHYSICAL THERAPY | Age: 77
Discharge: HOME | End: 2022-03-22
Attending: SPECIALIST
Payer: MEDICARE

## 2022-03-22 PROCEDURE — 97112 NEUROMUSCULAR REEDUCATION: CPT | Performed by: PHYSICAL THERAPIST

## 2022-03-22 PROCEDURE — 97110 THERAPEUTIC EXERCISES: CPT | Performed by: PHYSICAL THERAPIST

## 2022-03-22 PROCEDURE — 97116 GAIT TRAINING THERAPY: CPT | Performed by: PHYSICAL THERAPIST

## 2022-03-22 NOTE — PROGRESS NOTES
PT DAILY TREATMENT NOTE     Patient Name: Herson Fischer  Date:3/22/2022  : 1945  [x]  Patient  Verified  Payor: Giovanna Couch / Plan: [place] MEDICARE PART A & B / Product Type: Medicare /    In time:11:55A  Out time:12:44P  Total Treatment Time (min): 49  Visit #: 5 of 12    Medicare/BCBS Only   Total Timed Codes (min):  49 1:1 Treatment Time:  49       Treatment Area: Other displaced fracture of upper end of right humerus, initial encounter for closed fracture [S42.291A]  Other symptoms and signs involving the musculoskeletal system [R29.898]  History of falling [Z91.81]  Acute poliomyelitis, unspecified [A80.9]    SUBJECTIVE  Pain Level (0-10 scale): 0/10  Any medication changes, allergies to medications, adverse drug reactions, diagnosis change, or new procedure performed?: [x] No    [] Yes (see summary sheet for update)  Subjective functional status/changes:   [] No changes reported  Patient states she developed a bruise on her Left foot from the nu-Step recumbent stepper last week. Because of this did not start wearing brace over the weekend. OBJECTIVE    19 min Therapeutic Activity:  [x]? ?  See flow sheet :   Rationale: increase strength and improve coordination  to improve the patients ability to Tolerate basic ADLs and job-related tasks without pain.      15 min Neuromuscular Re-education:  [x]? ?  See flow sheet :   Rationale: improve coordination, improve balance and increase proprioception  to improve the patients ability to perform activities with good form and proprioception with tactile and verbal cuing appropriately.      15 min Gait Training:  _50x1_ feet with __parallel bars_ device on level surfaces with _CGA__ level of assist   Rationale: increase confidence with use of brace         With   [x] TE   [] TA   [x] neuro   [] other: Patient Education: [x] Review HEP    [x] Progressed/Changed HEP based on:   [x] positioning   [] body mechanics   [] transfers   [] heat/ice application    [] other: Other Objective/Functional Measures: Able to perform step up to 4\" step     Pain Level (0-10 scale) post treatment: 0/10    ASSESSMENT/Changes in Function: Patient is progressing towards goals of increased mobility independence in the home and in the community. Recommended starting to wear brace every day right after putting on her pants to allow her to become accustomed to wearing it and using it as an extension of her leg. Patient will continue to benefit from skilled PT services to modify and progress therapeutic interventions, address functional mobility deficits, address ROM deficits, address strength deficits, analyze and address soft tissue restrictions, analyze and cue movement patterns, analyze and modify body mechanics/ergonomics, assess and modify postural abnormalities, address imbalance/dizziness and instruct in home and community integration to attain remaining goals. [x]  See Plan of Care  []  See progress note/recertification  []  See Discharge Summary         Progress towards goals / Updated goals:  1. Patient will be I with HEP for carryover to home management               Eval: established              VGWQOZL: reports some compliance - progressing  2. Patient will be able to ambulate a total of 25 ft with brace and parallel bars to facilitate functional independence in the home.               Eval: 5 ft              Current: MET at 50ft this visit  [facility] be accomplished in 5 weeks:  1. Patient will be able to perform 5 sit to stands from the chair with use of arms in 45 secs or less to facilitate ease with transfers while wearing the brace               Eval: 3 min       Current:  10 not timed 3/17/22  2.  Patient will be able to ambulate 50ft with a rolling walker and CGA or less to facilitate ability to ambulate in the home without assistance.               Eval: 5 ft in parallel bars.    Current: MET at 50ft with rolling walker    PLAN  [x]  Upgrade activities as tolerated     [x]  Continue plan of care  []  Update interventions per flow sheet       []  Discharge due to:_  []  Other:_      Dariela Monzon, PT 3/22/2022  4:08 PM    Future Appointments   Date Time Provider Tabitha Araujo   3/24/2022 12:00 PM Melissa Branham, PRABHA [facility]   3/29/2022  1:30 PM Melissa Branham, PTA [facility]   3/31/2022 12:00 PM Melissa Branham, PTA [facility]   4/5/2022  1:00 PM Savana Loredo, NP [facility]

## 2022-03-24 ENCOUNTER — HOSPITAL ENCOUNTER (OUTPATIENT)
Dept: PHYSICAL THERAPY | Age: 77
Discharge: HOME | End: 2022-03-24
Attending: SPECIALIST
Payer: MEDICARE

## 2022-03-24 PROCEDURE — 97530 THERAPEUTIC ACTIVITIES: CPT

## 2022-03-24 PROCEDURE — 97116 GAIT TRAINING THERAPY: CPT

## 2022-03-24 PROCEDURE — 97112 NEUROMUSCULAR REEDUCATION: CPT

## 2022-03-24 NOTE — PROGRESS NOTES
PT DAILY TREATMENT NOTE     Patient Name: Lilian Listen  Date:3/24/2022  : 1945  [x]  Patient  Verified  Payor: Beth Duran / Plan: [place] & [place] / Product Type: Medicare /    In time: 3888 pm  Out time:1250 pm  Total Treatment Time (min): 48  Visit #: 6 of 12    Medicare/BCBS Only   Total Timed Codes (min):  48 1:1 Treatment Time:  48       Treatment Area: Other displaced fracture of upper end of right humerus, initial encounter for closed fracture [S42.291A]  Other symptoms and signs involving the musculoskeletal system [R29.898]  History of falling [Z91.81]  Acute poliomyelitis, unspecified [A80.9]    SUBJECTIVE  Pain Level (0-10 scale): 0/10  Any medication changes, allergies to medications, adverse drug reactions, diagnosis change, or new procedure performed?: [x] No    [] Yes (see summary sheet for update)  Subjective functional status/changes:   [] No changes reported  Patient reports feeling okay today. OBJECTIVE     min Therapeutic Exercise:  [x] See flow sheet :   Rationale: increase ROM and increase strength to improve the patients overall muscle endurance and activity tolerance for ADL's and functional tasks. 20 min Therapeutic Activity:  [x]  See flow sheet :   Rationale: increase strength and improve coordination  to improve the patients ability to safely perform dynamic activities and to improve functional performance of ADL's.      8 min Neuromuscular Re-education:  []  See flow sheet :   Rationale: increase strength, improve coordination and improve balance to improve the patients ability to perform activities with good form, stability and proprioception. 20 min Gait Training:  15 and 30 feet with RW device and W/C follow on level surfaces with CGA level of assist   Rationale:           With   [] TE   [] TA   [] neuro   [] other: Patient Education: [x] Review HEP    [] Progressed/Changed HEP based on:   [] positioning   [] body mechanics   [] transfers   [] heat/ice application    [] other:      Other Objective/Functional Measures:    4\" stairs x 2   Pain Level (0-10 scale) post treatment: 0/10    ASSESSMENT/Changes in Function:   Patient is progressing well towards goals of improving gait tolerance and (L) LE strength. Progressed exercises, as per flow sheet, to assist with improving stair negotiation. Patient required verbal and tactile cueing for proper sequence and for safety. Marshal Herman was adjust to stair height for descending and ascending stairs. Patient tolerated stair negotiation well. Patient will continue to benefit from skilled PT services to modify and progress therapeutic interventions, address functional mobility deficits, address ROM deficits, address strength deficits, analyze and address soft tissue restrictions, analyze and cue movement patterns, analyze and modify body mechanics/ergonomics, assess and modify postural abnormalities and instruct in home and community integration to attain remaining goals. []  See Plan of Care  []  See progress note/recertification  []  See Discharge Summary         Progress towards goals / Updated goals:  1. Patient will be I with HEP for carryover to home management               Eval: established              JVAASGO: reports some compliance - progressing  2. Patient will be able to ambulate a total of 25 ft with brace and parallel bars to facilitate functional independence in the home.               Eval: 5 ft              Current: MET at 50ft this visit  [address] be accomplished in 5 weeks:  1. Patient will be able to perform 5 sit to stands from the chair with use of arms in 45 secs or less to facilitate ease with transfers while wearing the brace               Eval: 3 min                  Current:  10 not timed 3/17/22  2.  Patient will be able to ambulate 50ft with a rolling walker and CGA or less to facilitate ability to ambulate in the home without assistance.               Eval: 5 ft in parallel bars.               Current: MET at 50ft with rolling walker    PLAN  [x]  Upgrade activities as tolerated     [x]  Continue plan of care  []  Update interventions per flow sheet       []  Discharge due to:_  []  Other:_      Lo Sol PTA 3/24/2022  12:13 PM    Future Appointments   Date Time Provider Tabitha Araujo   3/29/2022  1:30 PM Reyna Sams PTA [facility]   3/31/2022 12:00 PM Reyna Sams PTA [facility]   4/5/2022  1:00 PM MATILDA JaneAM BS AMB

## 2022-03-29 ENCOUNTER — HOSPITAL ENCOUNTER (OUTPATIENT)
Dept: PHYSICAL THERAPY | Age: 77
Discharge: HOME | End: 2022-03-29
Attending: SPECIALIST
Payer: MEDICARE

## 2022-03-29 PROCEDURE — 97116 GAIT TRAINING THERAPY: CPT

## 2022-03-29 PROCEDURE — 97110 THERAPEUTIC EXERCISES: CPT

## 2022-03-29 PROCEDURE — 97530 THERAPEUTIC ACTIVITIES: CPT

## 2022-03-29 NOTE — PROGRESS NOTES
PT DAILY TREATMENT NOTE     Patient Name: Ana Delacruz  Date:3/29/2022  : 1945  [x]  Patient  Verified  Payor: Alondra Medici / Plan: [place] MEDICARE PART A & B / Product Type: Medicare /    In time: 133 pm  Out time: 215 pm  Total Treatment Time (min): 42   Visit #: 7 of 12    Medicare/BCBS Only   Total Timed Codes (min):  42 1:1 Treatment Time:  42       Treatment Area: Other displaced fracture of upper end of right humerus, initial encounter for closed fracture [S42.291A]  Other symptoms and signs involving the musculoskeletal system [R29.898]  History of falling [Z91.81]  Acute poliomyelitis, unspecified [A80.9]    SUBJECTIVE  Pain Level (0-10 scale): 0/10  Any medication changes, allergies to medications, adverse drug reactions, diagnosis change, or new procedure performed?: [x] No    [] Yes (see summary sheet for update)  Subjective functional status/changes:   [] No changes reported  Patient reports no pain or discomfort today. Patient denies any pain or soreness after last visit. OBJECTIVE    12 min Therapeutic Exercise:  [x] See flow sheet :   Rationale: increase ROM and increase strength to improve the patients overall muscle endurance and activity tolerance for ADL's and functional tasks. 20 min Therapeutic Activity:  [x]  See flow sheet :   Rationale: increase strength and improve coordination  to improve the patients ability to safely perform dynamic activities and to improve functional performance of ADL's.       min Neuromuscular Re-education:  []  See flow sheet :   Rationale: increase strength, improve coordination and improve balance to improve the patients ability to perform activities with good form, stability and proprioception. 10 min Gait Trainin and 40 feet with RW device and W/C follow on level surfaces with CGA level of assist   Rationale:           With   [] TE   [] TA   [] neuro   [] other: Patient Education: [x] Review HEP    [] Progressed/Changed HEP based on:   [] positioning   [] body mechanics   [] transfers   [] heat/ice application    [] other:      Other Objective/Functional Measures: Added hip 3 way with left LE; LAQ with right LE; S/L leg press with right LE      Pain Level (0-10 scale) post treatment: 0/10    ASSESSMENT/Changes in Function:   Patient is progressing well towards goals of improving gait tolerance and (L) LE strength. Progressed exercises, as per flow sheet, to assist with improving right LE functional strength. Patient required verbal cueing for proper sequence with stair negotiation. Patient does well with unlocking and locking her brace when ambulating with RW. Patient tolerated today's session well. Patient will continue to benefit from skilled PT services to modify and progress therapeutic interventions, address functional mobility deficits, address ROM deficits, address strength deficits, analyze and address soft tissue restrictions, analyze and cue movement patterns, analyze and modify body mechanics/ergonomics, assess and modify postural abnormalities and instruct in home and community integration to attain remaining goals. []  See Plan of Care  []  See progress note/recertification  []  See Discharge Summary         Progress towards goals / Updated goals:  1. Patient will be I with [facility] for carryover to home management               Eval: established              SEYPJBZ: reports some compliance - progressing  2. Patient will be able to ambulate a total of 25 ft with brace and parallel bars to facilitate functional independence in the home.               Eval: 5 ft              Current: MET at 50ft this visit  [facility] be accomplished in 5 weeks:  1. Patient will be able to perform 5 sit to stands from the chair with use of arms in 45 secs or less to facilitate ease with transfers while wearing the brace               Eval: 3 min                  Current:  10 not timed 3/17/22  2.  Patient will be able to ambulate 50ft with a rolling walker and CGA or less to facilitate ability to ambulate in the home without assistance.               Eval: 5 ft in parallel bars.               Current: MET at 50ft with rolling walker    PLAN  [x]  Upgrade activities as tolerated     [x]  Continue plan of care  []  Update interventions per flow sheet       []  Discharge due to:_  []  Other:_      Nelly Lazaro PTA 3/29/2022  12:13 PM    Future Appointments   Date Time Provider Tabitha Araujo   3/29/2022  1:30 PM Priyank Burr PTA [facility]   3/31/2022 12:00 PM [facility]   4/5/2022  1:00 PM MATILDA Myrick BS AMB

## 2022-03-31 ENCOUNTER — HOSPITAL ENCOUNTER (OUTPATIENT)
Dept: PHYSICAL THERAPY | Age: 77
Discharge: HOME | End: 2022-03-31
Attending: SPECIALIST
Payer: MEDICARE

## 2022-03-31 PROCEDURE — 97530 THERAPEUTIC ACTIVITIES: CPT

## 2022-03-31 PROCEDURE — 97110 THERAPEUTIC EXERCISES: CPT

## 2022-03-31 PROCEDURE — 97116 GAIT TRAINING THERAPY: CPT

## 2022-03-31 NOTE — PROGRESS NOTES
PT DISCHARGE DAILY NOTE AND [id number]    Date:3/31/2022  Patient name: Dontrell Toussaint Start of Care: 3/8/2022   Referral source: Sylvia Real MD : 1945   Medical/Treatment Diagnosis: Other displaced fracture of upper end of right humerus, initial encounter for closed fracture [S42.291A]  Other symptoms and signs involving the musculoskeletal system [R29.898]  History of falling [Z91.81]  Acute poliomyelitis, unspecified [A80.9] Onset Date:2021                 Prior Hospitalization: see medical history Provider#: [id number]   Medications: Verified on Patient Summary List    Comorbidities:Polio survivor, HBP, visual impairment, high cholestrol  Prior Level of Function:Mod I with ADLs, requiring a RW for transfers, gait in the home and community, able to climb up and down stairs with RW. Visits from Start of Care: 8 Missed Visits: 0    Reporting Period : 3/8/2022  to 3/31/2022    [x]  Patient  Verified  Payor: Alanna Briones / Plan: [place] MEDICARE PART A & B / Product Type: Medicare /    In time:1150 am  Out time:1235 pm   Total Treatment Time (min): 45  Visit #: 8 of 12    Medicare/BCBS Only   Total Timed Codes (min):  45 1:1 Treatment Time:  45       SUBJECTIVE  Pain Level (0-10 scale): 0/10   Any medication changes, allergies to medications, adverse drug reactions, diagnosis change, or new procedure performed?: [x] No    [] Yes (see summary sheet for update)  Subjective functional status/changes:   [] No changes reported  Patient reports feeling pretty good today and no increase in soreness after last visit. OBJECTIVE      15 min Therapeutic Exercise:  [x] See flow sheet :   Rationale: increase ROM and increase strength to improve the patients overall muscle endurance and activity tolerance for ADL's and functional tasks.       20 min Therapeutic Activity:  [x]  See flow sheet :   Rationale: increase strength and improve coordination  to improve the patients ability to safely perform dynamic activities and to improve functional performance of ADL's, ambulation and stair negotiation. 10 min Gait Trainin, 20 feet with RW device on level surfaces with CGA level of assist   Rationale: With   [] TE   [] TA   [] neuro   [] other: Patient Education: [x] Review HEP    [] Progressed/Changed HEP based on:   [] positioning   [] body mechanics   [] transfers   [] heat/ice application    [] other:      Other Objective/Functional Measures:   FOTO Assessment score- 43 points   Ascend 4\" steps with walker and left LE brace and descend 6\" steps x 3   Pain Level (0-10 scale) post treatment: 0/10    Summary of Care:  Goal:Patient will be I with HEP for carryover to home management  Status at last note/certification: Eval: established  Status at discharge: Patient performing HEP 1-3 times a day. MET    Goal: Patient will be able to ambulate a total of 25 ft with brace and parallel bars to facilitate functional independence in the home. Status at last note/certification: Eval: 5 ft  Status at discharge: Ambulate 50 ft in parallel bars (3/10/2022) MET     Goal:Patient will be able to perform 5 sit to stands from the chair with use of arms in 45 secs or less to facilitate ease with transfers while wearing the brace  Status at last note/certification:Eval: 3 min      Status at discharge: Patient able to perform 5 sit to stands in 38 secs MET     Goal:Patient will be able to ambulate 50ft with a rolling walker and CGA or less to facilitate ability to ambulate in the home without assistance. Status at last note/certification: Eval: 5 ft in parallel bars  Status at discharge: Patient able to ambulate 50ft with rolling walker and CGA MET     ASSESSMENT/Changes in Function:   Patient attended 8 PT sessions and progressed well towards goals. Patient was able to build confidence with stair negotiation and ambulating with RW, left LE brace, safely and effectively.  Patient and  agree that the last 2 weeks of PT have helped tremendously and would like to be discharged at this time.      Thank you for this referral!     PLAN  [x]Discontinue therapy: [x]Patient has reached or is progressing toward set goals      []Patient is non-compliant or has abdicated      []Due to lack of appreciable progress towards set goals    Yao Boone, PRABHA 3/31/2022  11:52 AM

## 2022-04-04 NOTE — PROGRESS NOTES
Chief Complaint   Patient presents with    Cholesterol Problem     high chol    Hypertension     Assessment & Plan:     1. Hyperlipidemia LDL goal <100  Assessment & Plan:  Advised to complete fasting labs and follow up in 4 weeks  Orders:  -     LIPID PANEL; Future  -     METABOLIC PANEL, COMPREHENSIVE; Future  2. Primary hypertension  Assessment & Plan:  BP remains elevated, goal <130/80  Increase amlodipine to 10 mg and follow up in 4 weeks  Consider adding low-dose Losartan 25 mg if BP goal not achieved at recheck  Orders:  -     METABOLIC PANEL, COMPREHENSIVE; Future  -     amLODIPine (NORVASC) 10 mg tablet; Take 1 Tablet by mouth daily. Indications: high blood pressure, Normal, Disp-90 Tablet, R-0    Follow-up and Dispositions    · Return in about 4 weeks (around 5/3/2022) for blood pressure, cholesterol, lab results. Subjective:     HPI    Hyperlipidemia  Compliant with meds  S/E from medication:  None   Comorbid:  HTN  Current treatment: atorvastatin 20 mg  Has not completed repeat labs    Hypertension-  Symptoms:  None  BP readings at home are not being taken  Comorbid:  HLD  Current treatment: amlodipine 5 mg    Health Maintenance:  COVID-19 vac - due for booster  Pneumonia vac- recommended  Shingles vac - recommended    Review of Systems   Constitutional: Negative for chills, fever and malaise/fatigue. Respiratory: Negative for shortness of breath. Cardiovascular: Negative for chest pain and leg swelling. Neurological: Negative for dizziness and headaches. Objective:   BP (!) 157/73   Pulse 80   Temp 98 °F (36.7 °C) (Oral)   Resp 16   SpO2 95%      Physical Exam  Vitals and nursing note reviewed. Constitutional:       General: She is not in acute distress. Appearance: She is not ill-appearing. HENT:      Head: Normocephalic and atraumatic. Cardiovascular:      Rate and Rhythm: Normal rate and regular rhythm. Heart sounds: No murmur heard. No friction rub. No gallop. Pulmonary:      Effort: Pulmonary effort is normal. No respiratory distress. Breath sounds: No wheezing, rhonchi or rales. Skin:     General: Skin is warm and dry. Neurological:      General: No focal deficit present. Mental Status: She is alert and oriented to person, place, and time. Psychiatric:         Mood and Affect: Mood normal.         Thought Content:  Thought content normal.         Judgment: Judgment normal.            CONY Nielson

## 2022-04-05 ENCOUNTER — OFFICE VISIT (OUTPATIENT)
Dept: FAMILY MEDICINE CLINIC | Age: 77
End: 2022-04-05
Payer: MEDICARE

## 2022-04-05 VITALS
OXYGEN SATURATION: 95 % | RESPIRATION RATE: 16 BRPM | DIASTOLIC BLOOD PRESSURE: 73 MMHG | TEMPERATURE: 98 F | SYSTOLIC BLOOD PRESSURE: 157 MMHG | HEART RATE: 80 BPM

## 2022-04-05 DIAGNOSIS — I10 PRIMARY HYPERTENSION: ICD-10-CM

## 2022-04-05 DIAGNOSIS — E78.5 HYPERLIPIDEMIA LDL GOAL <100: Primary | ICD-10-CM

## 2022-04-05 PROCEDURE — 1101F PT FALLS ASSESS-DOCD LE1/YR: CPT | Performed by: NURSE PRACTITIONER

## 2022-04-05 PROCEDURE — G8419 CALC BMI OUT NRM PARAM NOF/U: HCPCS | Performed by: NURSE PRACTITIONER

## 2022-04-05 PROCEDURE — G8754 DIAS BP LESS 90: HCPCS | Performed by: NURSE PRACTITIONER

## 2022-04-05 PROCEDURE — 99214 OFFICE O/P EST MOD 30 MIN: CPT | Performed by: NURSE PRACTITIONER

## 2022-04-05 PROCEDURE — G8399 PT W/DXA RESULTS DOCUMENT: HCPCS | Performed by: NURSE PRACTITIONER

## 2022-04-05 PROCEDURE — G8432 DEP SCR NOT DOC, RNG: HCPCS | Performed by: NURSE PRACTITIONER

## 2022-04-05 PROCEDURE — G8753 SYS BP > OR = 140: HCPCS | Performed by: NURSE PRACTITIONER

## 2022-04-05 PROCEDURE — 1090F PRES/ABSN URINE INCON ASSESS: CPT | Performed by: NURSE PRACTITIONER

## 2022-04-05 PROCEDURE — 3017F COLORECTAL CA SCREEN DOC REV: CPT | Performed by: NURSE PRACTITIONER

## 2022-04-05 PROCEDURE — G8536 NO DOC ELDER MAL SCRN: HCPCS | Performed by: NURSE PRACTITIONER

## 2022-04-05 PROCEDURE — G8427 DOCREV CUR MEDS BY ELIG CLIN: HCPCS | Performed by: NURSE PRACTITIONER

## 2022-04-05 RX ORDER — AMLODIPINE BESYLATE 10 MG/1
10 TABLET ORAL DAILY
Qty: 90 TABLET | Refills: 0 | Status: SHIPPED | OUTPATIENT
Start: 2022-04-05 | End: 2022-07-26 | Stop reason: SDUPTHER

## 2022-04-05 NOTE — PROGRESS NOTES
Ryan Base presents today for   Chief Complaint   Patient presents with    Cholesterol Problem     high chol    Hypertension       Is someone accompanying this pt? Yes, , Mary Kumar    Is the patient using any DME equipment during OV? Yes, wheelchair    Depression Screening:  3 most recent PHQ Screens 4/5/2022   Little interest or pleasure in doing things Not at all   Feeling down, depressed, irritable, or hopeless Not at all   Total Score PHQ 2 0       Learning Assessment:  Learning Assessment 12/8/2021   PRIMARY LEARNER Patient   HIGHEST LEVEL OF EDUCATION - PRIMARY LEARNER  SOME COLLEGE   BARRIERS PRIMARY LEARNER NONE   CO-LEARNER CAREGIVER No   PRIMARY LANGUAGE ENGLISH   LEARNER PREFERENCE PRIMARY DEMONSTRATION   ANSWERED BY patient    RELATIONSHIP SELF       Abuse Screening:  Abuse Screening Questionnaire 1/4/2022   Do you ever feel afraid of your partner? N   Are you in a relationship with someone who physically or mentally threatens you? N   Is it safe for you to go home? Y       Fall Screening  Fall Risk Assessment, last 12 mths 1/4/2022   Able to walk? Yes   Fall in past 12 months? 1   Do you feel unsteady? 0   Are you worried about falling 1   Is TUG test greater than 12 seconds? 0   Is the gait abnormal? 1   Number of falls in past 12 months 1   Fall with injury? 0       Generalized Anxiety  No flowsheet data found. Health Maintenance Due   Topic Date Due    Shingrix Vaccine Age 49> (1 of 2) Never done    Pneumococcal 65+ years (1 of 1 - PPSV23) Never done    COVID-19 Vaccine (3 - Booster for [employer] series) 09/09/2021   . Health Maintenance reviewed and discussed and ordered per Provider. Coordination of Care  1. Have you been to the ER, urgent care clinic since your last visit? Hospitalized since your last visit? no    2. Have you seen or consulted any other health care providers outside of the [address] [place] [address] since your last visit?   Include any pap smears or colon screening.  no

## 2022-04-05 NOTE — ASSESSMENT & PLAN NOTE
BP remains elevated, goal <130/80  Increase amlodipine to 10 mg and follow up in 4 weeks  Consider adding low-dose Losartan 25 mg if BP goal not achieved at recheck

## 2022-04-20 ENCOUNTER — HOSPITAL ENCOUNTER (OUTPATIENT)
Dept: LAB | Age: 77
Discharge: HOME | End: 2022-04-20
Payer: MEDICARE

## 2022-04-20 DIAGNOSIS — E78.5 HYPERLIPIDEMIA LDL GOAL <100: ICD-10-CM

## 2022-04-20 DIAGNOSIS — I10 PRIMARY HYPERTENSION: ICD-10-CM

## 2022-04-20 LAB
ALBUMIN SERPL-MCNC: 4.1 G/DL (ref 3.4–5)
ALBUMIN/GLOB SERPL: 1.1 {RATIO} (ref 0.8–1.7)
ALP SERPL-CCNC: 107 U/L (ref 45–117)
ALT SERPL-CCNC: 29 U/L (ref 13–56)
ANION GAP SERPL CALC-SCNC: 5 MMOL/L (ref 3–18)
AST SERPL-CCNC: 25 U/L (ref 10–38)
BILIRUB SERPL-MCNC: 0.4 MG/DL (ref 0.2–1)
BUN SERPL-MCNC: 23 MG/DL (ref 7–18)
BUN/CREAT SERPL: 26 (ref 12–20)
CALCIUM SERPL-MCNC: 9.6 MG/DL (ref 8.5–10.1)
CHLORIDE SERPL-SCNC: 107 MMOL/L (ref 100–111)
CHOLEST SERPL-MCNC: 189 MG/DL
CO2 SERPL-SCNC: 28 MMOL/L (ref 21–32)
CREAT SERPL-MCNC: 0.9 MG/DL (ref 0.6–1.3)
GLOBULIN SER CALC-MCNC: 3.9 G/DL (ref 2–4)
GLUCOSE SERPL-MCNC: 92 MG/DL (ref 74–99)
HDLC SERPL-MCNC: 77 MG/DL (ref 40–60)
HDLC SERPL: 2.5 {RATIO} (ref 0–5)
LDLC SERPL CALC-MCNC: 91.6 MG/DL (ref 0–100)
LIPID PROFILE,FLP: ABNORMAL
POTASSIUM SERPL-SCNC: 3.7 MMOL/L (ref 3.5–5.5)
PROT SERPL-MCNC: 8 G/DL (ref 6.4–8.2)
SODIUM SERPL-SCNC: 140 MMOL/L (ref 136–145)
TRIGL SERPL-MCNC: 102 MG/DL (ref ?–150)
VLDLC SERPL CALC-MCNC: 20.4 MG/DL

## 2022-04-20 PROCEDURE — 36415 COLL VENOUS BLD VENIPUNCTURE: CPT

## 2022-04-20 PROCEDURE — 80053 COMPREHEN METABOLIC PANEL: CPT

## 2022-04-20 PROCEDURE — 80061 LIPID PANEL: CPT

## 2022-05-03 ENCOUNTER — OFFICE VISIT (OUTPATIENT)
Dept: FAMILY MEDICINE CLINIC | Age: 77
End: 2022-05-03
Payer: MEDICARE

## 2022-05-03 VITALS
HEIGHT: 62 IN | RESPIRATION RATE: 16 BRPM | BODY MASS INDEX: 25.76 KG/M2 | WEIGHT: 140 LBS | HEART RATE: 85 BPM | SYSTOLIC BLOOD PRESSURE: 152 MMHG | TEMPERATURE: 97.9 F | OXYGEN SATURATION: 98 % | DIASTOLIC BLOOD PRESSURE: 75 MMHG

## 2022-05-03 DIAGNOSIS — R76.8 POSITIVE HEPATITIS C ANTIBODY TEST: ICD-10-CM

## 2022-05-03 DIAGNOSIS — Z71.2 ENCOUNTER TO DISCUSS TEST RESULTS: ICD-10-CM

## 2022-05-03 DIAGNOSIS — E78.5 HYPERLIPIDEMIA LDL GOAL <100: Primary | ICD-10-CM

## 2022-05-03 DIAGNOSIS — I10 PRIMARY HYPERTENSION: ICD-10-CM

## 2022-05-03 PROCEDURE — G8754 DIAS BP LESS 90: HCPCS | Performed by: NURSE PRACTITIONER

## 2022-05-03 PROCEDURE — 1101F PT FALLS ASSESS-DOCD LE1/YR: CPT | Performed by: NURSE PRACTITIONER

## 2022-05-03 PROCEDURE — G8427 DOCREV CUR MEDS BY ELIG CLIN: HCPCS | Performed by: NURSE PRACTITIONER

## 2022-05-03 PROCEDURE — 1090F PRES/ABSN URINE INCON ASSESS: CPT | Performed by: NURSE PRACTITIONER

## 2022-05-03 PROCEDURE — G8432 DEP SCR NOT DOC, RNG: HCPCS | Performed by: NURSE PRACTITIONER

## 2022-05-03 PROCEDURE — G8753 SYS BP > OR = 140: HCPCS | Performed by: NURSE PRACTITIONER

## 2022-05-03 PROCEDURE — G8536 NO DOC ELDER MAL SCRN: HCPCS | Performed by: NURSE PRACTITIONER

## 2022-05-03 PROCEDURE — 99214 OFFICE O/P EST MOD 30 MIN: CPT | Performed by: NURSE PRACTITIONER

## 2022-05-03 PROCEDURE — G8419 CALC BMI OUT NRM PARAM NOF/U: HCPCS | Performed by: NURSE PRACTITIONER

## 2022-05-03 PROCEDURE — G8399 PT W/DXA RESULTS DOCUMENT: HCPCS | Performed by: NURSE PRACTITIONER

## 2022-05-03 RX ORDER — ATORVASTATIN CALCIUM 20 MG/1
20 TABLET, FILM COATED ORAL DAILY
Qty: 90 TABLET | Refills: 0 | Status: SHIPPED | OUTPATIENT
Start: 2022-05-03 | End: 2022-10-17

## 2022-05-03 NOTE — ASSESSMENT & PLAN NOTE
Home BP readings reviewed, normotensive  Continue regimen for now and follow up virtually in 4 weeks  Advised to have BP monitor ready for virtual appointment, continue home BP monitoring  Consider low-dose Losartan if BP goal of <130/80 not achieved at recheck

## 2022-05-03 NOTE — PROGRESS NOTES
Chief Complaint   Patient presents with    Cholesterol Problem    Hypertension    Labs     Assessment & Plan:     1. Hyperlipidemia LDL goal <100  Assessment & Plan:  LDL now at goal, <70, continue regimen  Recheck lipid panel in 6 mos  Orders:  -     LIPID PANEL; Future  -     METABOLIC PANEL, COMPREHENSIVE; Future  -     atorvastatin (LIPITOR) 20 mg tablet; Take 1 Tablet by mouth daily. , Normal, Disp-90 Tablet, R-0  2. Primary hypertension  Assessment & Plan:  Home BP readings reviewed, normotensive  Continue regimen for now and follow up virtually in 4 weeks  Advised to have BP monitor ready for virtual appointment, continue home BP monitoring  Consider low-dose Losartan if BP goal of <130/80 not achieved at recheck  Orders:  -     METABOLIC PANEL, COMPREHENSIVE; Future  3. Positive hepatitis C antibody test  Assessment & Plan:  Evaluated by GI, negative test confirmed by repeat blood work    4. Encounter to discuss test results    Follow-up and Dispositions    · Return in about 4 weeks (around 5/31/2022) for blood pressure and  · Return in 4 mos for cholesterol, blood pressure (no labs needed)       Subjective:     HPI    Hyperlipidemia  Compliant with meds  Comorbid:  HTN  Current treatment:  Atorvastatin 20 mg    Hypertension-  Symptoms:  None  BP readings at home are 1-teens to 030C systolic  Comorbid:  HLD  Current treatment: amlodipine 10 mg (increased four weeks ago)    Positive Hep C AB Test -  Has been evaluated by GI  Repeat test was negative    Health Maintenance:  COVID-19 vac -  Due for booster  Pneumonia vac- recommended, will wait for prevnar 20  Shingles vac - recommended      Review of Systems   Constitutional: Negative for chills, fever and malaise/fatigue. Respiratory: Negative for shortness of breath. Cardiovascular: Negative for chest pain.        Objective:   BP (!) 152/75 Comment: patient's own monitor  Pulse 85   Temp 97.9 °F (36.6 °C) (Oral)   Resp 16   Ht 5' 2\" (1.575 m)   Wt 140 lb (63.5 kg)   SpO2 98%   BMI 25.61 kg/m²      Physical Exam  Vitals and nursing note reviewed. Constitutional:       General: She is not in acute distress. Appearance: She is not ill-appearing. HENT:      Head: Normocephalic and atraumatic. Cardiovascular:      Rate and Rhythm: Normal rate and regular rhythm. Heart sounds: No murmur heard. No friction rub. No gallop. Pulmonary:      Effort: Pulmonary effort is normal. No respiratory distress. Breath sounds: No wheezing, rhonchi or rales. Skin:     General: Skin is warm and dry. Neurological:      General: No focal deficit present. Mental Status: She is alert and oriented to person, place, and time. Psychiatric:         Mood and Affect: Mood normal.         Thought Content:  Thought content normal.         Judgment: Judgment normal.            KOKI AbdiC

## 2022-05-03 NOTE — PROGRESS NOTES
Lisa Browning presents today for   Chief Complaint   Patient presents with    Cholesterol Problem    Hypertension    Labs       Is someone accompanying this pt? Yes      Is the patient using any DME equipment during [address]? Yes patient ride in wheelchair     Depression Screening:  3 most recent PHQ Screens 5/3/2022   Little interest or pleasure in doing things Not at all   Feeling down, depressed, irritable, or hopeless Not at all   Total Score PHQ 2 0       Learning Assessment:  Learning Assessment 12/8/2021   PRIMARY LEARNER Patient   HIGHEST LEVEL OF EDUCATION - PRIMARY LEARNER  SOME COLLEGE   BARRIERS PRIMARY LEARNER NONE   CO-LEARNER CAREGIVER No   PRIMARY LANGUAGE ENGLISH   LEARNER PREFERENCE PRIMARY DEMONSTRATION   ANSWERED BY patient    RELATIONSHIP SELF       Fall Risk  Fall Risk Assessment, last 12 mths 1/4/2022   Able to walk? Yes   Fall in past 12 months? 1   Do you feel unsteady? 0   Are you worried about falling 1   Is TUG test greater than 12 seconds? 0   Is the gait abnormal? 1   Number of falls in past 12 months 1   Fall with injury? 0       ADL  No flowsheet data found. Travel Screening:    Travel Screening     Question   Response    In the last 10 days, have you been in contact with someone who was confirmed or suspected to have Coronavirus/COVID-19? No / Unsure    Have you had a COVID-19 viral test in the last 10 days? No    Do you have any of the following new or worsening symptoms? Have you traveled internationally or domestically in the last month? No      Travel History   Travel since 04/03/22    No documented travel since 04/03/22         Health Maintenance reviewed and discussed and ordered per Provider. Health Maintenance Due   Topic Date Due    Shingrix Vaccine Age 49> (1 of 2) Never done    Pneumococcal 65+ years (1 - PCV) Never done    COVID-19 Vaccine (3 - Booster for [employer] series) 09/09/2021   . Coordination of Care:  1.  Have you been to the ER, urgent care clinic since your last visit? Hospitalized since your last visit? No     2. Have you seen or consulted any other health care providers outside of the [address] [place] [address] since your last visit? Include any pap smears or colon screening.  No

## 2022-10-17 DIAGNOSIS — E78.5 HYPERLIPIDEMIA LDL GOAL <100: ICD-10-CM

## 2022-10-17 RX ORDER — ATORVASTATIN CALCIUM 20 MG/1
20 TABLET, FILM COATED ORAL DAILY
Qty: 90 TABLET | Refills: 0 | OUTPATIENT
Start: 2022-10-17

## 2022-10-17 RX ORDER — ATORVASTATIN CALCIUM 20 MG/1
TABLET, FILM COATED ORAL
Qty: 90 TABLET | Refills: 0 | Status: SHIPPED | OUTPATIENT
Start: 2022-10-17

## 2022-10-17 NOTE — TELEPHONE ENCOUNTER
Pt requesting refill please advise  Requested Prescriptions     Pending Prescriptions Disp Refills    atorvastatin (LIPITOR) 20 mg tablet 90 Tablet 0     Sig: Take 1 Tablet by mouth daily.

## 2022-10-24 ENCOUNTER — HOSPITAL ENCOUNTER (OUTPATIENT)
Dept: LAB | Age: 77
Discharge: HOME | End: 2022-10-24
Payer: MEDICARE

## 2022-10-24 DIAGNOSIS — I10 PRIMARY HYPERTENSION: ICD-10-CM

## 2022-10-24 DIAGNOSIS — E78.5 HYPERLIPIDEMIA LDL GOAL <100: ICD-10-CM

## 2022-10-24 LAB
ALBUMIN SERPL-MCNC: 4.1 G/DL (ref 3.4–5)
ALBUMIN/GLOB SERPL: 1.1 {RATIO} (ref 0.8–1.7)
ALP SERPL-CCNC: 628 U/L (ref 45–117)
ALT SERPL-CCNC: 84 U/L (ref 13–56)
ANION GAP SERPL CALC-SCNC: 7 MMOL/L (ref 3–18)
AST SERPL-CCNC: 66 U/L (ref 10–38)
BILIRUB SERPL-MCNC: 0.7 MG/DL (ref 0.2–1)
BUN SERPL-MCNC: 18 MG/DL (ref 7–18)
BUN/CREAT SERPL: 23 (ref 12–20)
CALCIUM SERPL-MCNC: 9.5 MG/DL (ref 8.5–10.1)
CHLORIDE SERPL-SCNC: 104 MMOL/L (ref 100–111)
CHOLEST SERPL-MCNC: 238 MG/DL
CO2 SERPL-SCNC: 28 MMOL/L (ref 21–32)
CREAT SERPL-MCNC: 0.78 MG/DL (ref 0.6–1.3)
GLOBULIN SER CALC-MCNC: 3.8 G/DL (ref 2–4)
GLUCOSE SERPL-MCNC: 92 MG/DL (ref 74–99)
HDLC SERPL-MCNC: 100 MG/DL (ref 40–60)
HDLC SERPL: 2.4 {RATIO} (ref 0–5)
LDLC SERPL CALC-MCNC: 119 MG/DL (ref 0–100)
LIPID PROFILE,FLP: ABNORMAL
POTASSIUM SERPL-SCNC: 4.2 MMOL/L (ref 3.5–5.5)
PROT SERPL-MCNC: 7.9 G/DL (ref 6.4–8.2)
SODIUM SERPL-SCNC: 139 MMOL/L (ref 136–145)
TRIGL SERPL-MCNC: 95 MG/DL (ref ?–150)
VLDLC SERPL CALC-MCNC: 19 MG/DL

## 2022-10-24 PROCEDURE — 80053 COMPREHEN METABOLIC PANEL: CPT

## 2022-10-24 PROCEDURE — 80061 LIPID PANEL: CPT

## 2022-10-24 PROCEDURE — 36415 COLL VENOUS BLD VENIPUNCTURE: CPT

## 2022-11-01 PROBLEM — R79.89 ELEVATED LFTS: Status: ACTIVE | Noted: 2022-11-01

## 2022-11-02 NOTE — ASSESSMENT & PLAN NOTE
Recurrent, hold atorvastatin and recheck hepatic function panel in 6 weeks  Consider sending back to GI if persists

## 2022-11-02 NOTE — PROGRESS NOTES
Chief Complaint   Patient presents with    Hypertension    Cholesterol Problem    Labs     Assessment & Plan:     1. Elevated LFTs  Assessment & Plan:  Recurrent, hold atorvastatin and recheck hepatic function panel in 6 weeks  Consider sending back to GI if persists  Orders:  -     HEPATIC FUNCTION PANEL; Future  -     METABOLIC PANEL, COMPREHENSIVE; Future  2. Hyperlipidemia LDL goal <100  Assessment & Plan:  LDL elevated, now with elevated liver enzymes  Hold statin until labs recheck  Orders:  -     METABOLIC PANEL, COMPREHENSIVE; Future  -     LIPID PANEL; Future  3. Primary hypertension  Assessment & Plan:  Reviewed normotensive readings at home, continue regimen for now and re-evaluate in 7 weeks  Orders:  -     amLODIPine (NORVASC) 10 mg tablet; Take 1 Tablet by mouth daily. Indications: high blood pressure, Normal, Disp-90 Tablet, R-1  -     CBC WITH AUTOMATED DIFF; Future  4. Needs flu shot  -     INFLUENZA, FLUAD, (AGE 65 Y+), IM, PF, 0.5 ML  5. Encounter to discuss test results    Follow-up and Dispositions    Return in about 7 weeks (around 12/22/2022) for elevated liver enyzmes, blood pressure, lab results (virtual) and  Return in 3 mos for medicare wellness visit, blood pressure, cholesterol, elevated liver enzymes, lab results       Subjective:     HPI    Patient presents today, accompanied by spouse, Kelsea Leong. Elevated LFTs-  Symptoms:  None  ETOH: None   Risk factors: on atorvastatin 20 mg that was started January 2022 but normal LFTs in April 2022  Imaging:  None     Hyperlipidemia  Compliant with meds  Comorbid: HTN  Current treatment: atorvastatin 20 mg started January 2022  Thinks the medication makes her dizzy    Hypertension-  Symptoms:  None  BP readings at home are 171-337B systolic  Comorbid: HLD  Current treatment: amlodipine 10 mg    Review of Systems   Constitutional:  Negative for chills, fever and malaise/fatigue. Respiratory:  Negative for shortness of breath. Cardiovascular:  Negative for chest pain. Neurological:  Positive for dizziness. Objective:   BP (!) 140/84 (BP 1 Location: Left upper arm, BP Patient Position: Sitting, BP Cuff Size: Adult)   Pulse 92   Resp 16   Ht 5' 2\" (1.575 m)   Wt 140 lb (63.5 kg)   SpO2 99%   BMI 25.61 kg/m²      Physical Exam  Vitals and nursing note reviewed. Constitutional:       General: She is not in acute distress. Appearance: She is not ill-appearing. HENT:      Head: Normocephalic and atraumatic. Cardiovascular:      Rate and Rhythm: Normal rate and regular rhythm. Pulmonary:      Effort: Pulmonary effort is normal. No respiratory distress. Breath sounds: No wheezing, rhonchi or rales. Skin:     General: Skin is warm and dry. Neurological:      General: No focal deficit present. Mental Status: She is alert and oriented to person, place, and time. Psychiatric:         Mood and Affect: Mood normal.         Thought Content:  Thought content normal.         Judgment: Judgment normal.          CONY Abrams

## 2022-11-03 ENCOUNTER — OFFICE VISIT (OUTPATIENT)
Dept: FAMILY MEDICINE CLINIC | Age: 77
End: 2022-11-03
Payer: MEDICARE

## 2022-11-03 VITALS
RESPIRATION RATE: 16 BRPM | OXYGEN SATURATION: 99 % | BODY MASS INDEX: 25.76 KG/M2 | DIASTOLIC BLOOD PRESSURE: 84 MMHG | SYSTOLIC BLOOD PRESSURE: 140 MMHG | HEART RATE: 92 BPM | WEIGHT: 140 LBS | HEIGHT: 62 IN

## 2022-11-03 DIAGNOSIS — R79.89 ELEVATED LFTS: Primary | ICD-10-CM

## 2022-11-03 DIAGNOSIS — I10 PRIMARY HYPERTENSION: ICD-10-CM

## 2022-11-03 DIAGNOSIS — Z71.2 ENCOUNTER TO DISCUSS TEST RESULTS: ICD-10-CM

## 2022-11-03 DIAGNOSIS — Z23 NEEDS FLU SHOT: ICD-10-CM

## 2022-11-03 DIAGNOSIS — E78.5 HYPERLIPIDEMIA LDL GOAL <100: ICD-10-CM

## 2022-11-03 PROCEDURE — 3078F DIAST BP <80 MM HG: CPT | Performed by: NURSE PRACTITIONER

## 2022-11-03 PROCEDURE — 99214 OFFICE O/P EST MOD 30 MIN: CPT | Performed by: NURSE PRACTITIONER

## 2022-11-03 PROCEDURE — G8399 PT W/DXA RESULTS DOCUMENT: HCPCS | Performed by: NURSE PRACTITIONER

## 2022-11-03 PROCEDURE — 1101F PT FALLS ASSESS-DOCD LE1/YR: CPT | Performed by: NURSE PRACTITIONER

## 2022-11-03 PROCEDURE — 3074F SYST BP LT 130 MM HG: CPT | Performed by: NURSE PRACTITIONER

## 2022-11-03 PROCEDURE — G8427 DOCREV CUR MEDS BY ELIG CLIN: HCPCS | Performed by: NURSE PRACTITIONER

## 2022-11-03 PROCEDURE — 1090F PRES/ABSN URINE INCON ASSESS: CPT | Performed by: NURSE PRACTITIONER

## 2022-11-03 PROCEDURE — 90694 VACC AIIV4 NO PRSRV 0.5ML IM: CPT | Performed by: NURSE PRACTITIONER

## 2022-11-03 PROCEDURE — G8417 CALC BMI ABV UP PARAM F/U: HCPCS | Performed by: NURSE PRACTITIONER

## 2022-11-03 PROCEDURE — 1123F ACP DISCUSS/DSCN MKR DOCD: CPT | Performed by: NURSE PRACTITIONER

## 2022-11-03 PROCEDURE — G8754 DIAS BP LESS 90: HCPCS | Performed by: NURSE PRACTITIONER

## 2022-11-03 PROCEDURE — G8432 DEP SCR NOT DOC, RNG: HCPCS | Performed by: NURSE PRACTITIONER

## 2022-11-03 PROCEDURE — G0008 ADMIN INFLUENZA VIRUS VAC: HCPCS | Performed by: NURSE PRACTITIONER

## 2022-11-03 PROCEDURE — G8753 SYS BP > OR = 140: HCPCS | Performed by: NURSE PRACTITIONER

## 2022-11-03 PROCEDURE — G8536 NO DOC ELDER MAL SCRN: HCPCS | Performed by: NURSE PRACTITIONER

## 2022-11-03 RX ORDER — AMLODIPINE BESYLATE 10 MG/1
10 TABLET ORAL DAILY
Qty: 90 TABLET | Refills: 1 | Status: SHIPPED | OUTPATIENT
Start: 2022-11-03

## 2022-11-03 NOTE — PROGRESS NOTES
Obtained consent from patient. Per verbal order from MATILDA Driscoll Injection of FLU high dose  administered. Verified by me and MATILDA Driscoll that this is the correct immunization/injection. Patient observed for 15 minutes with no adverse reaction.

## 2022-11-03 NOTE — PROGRESS NOTES
Ally Barker presents today for   Chief Complaint   Patient presents with    Hypertension    Cholesterol Problem    Labs       Is someone accompanying this pt? no    Is the patient using any DME equipment during OV? no    Depression Screening:  3 most recent PHQ Screens 11/3/2022   Little interest or pleasure in doing things Not at all   Feeling down, depressed, irritable, or hopeless Not at all   Total Score PHQ 2 0       Learning Assessment:  Learning Assessment 12/8/2021   PRIMARY LEARNER Patient   HIGHEST LEVEL OF EDUCATION - PRIMARY LEARNER  SOME COLLEGE   BARRIERS PRIMARY LEARNER NONE   CO-LEARNER CAREGIVER No   PRIMARY LANGUAGE ENGLISH   LEARNER PREFERENCE PRIMARY DEMONSTRATION   ANSWERED BY patient    RELATIONSHIP SELF       Fall Risk  Fall Risk Assessment, last 12 mths 1/4/2022   Able to walk? Yes   Fall in past 12 months? 1   Do you feel unsteady? 0   Are you worried about falling 1   Is TUG test greater than 12 seconds? 0   Is the gait abnormal? 1   Number of falls in past 12 months 1   Fall with injury? 0       ADL  No flowsheet data found. Travel Screening:    Travel Screening       Question Response    In the last 10 days, have you been in contact with someone who was confirmed or suspected to have Coronavirus/COVID-19? No / Unsure    Have you had a COVID-19 viral test in the last 10 days? No    Do you have any of the following new or worsening symptoms? None of these    Have you traveled internationally or domestically in the last month? No          Travel History   Travel since 10/03/22    No documented travel since 10/03/22         Health Maintenance reviewed and discussed and ordered per Provider. Health Maintenance Due   Topic Date Due    Shingrix Vaccine Age 49> (1 of 2) Never done    Pneumococcal 65+ years (1 - PCV) Never done    COVID-19 Vaccine (3 - Booster for Pfizer series) 06/04/2021    Flu Vaccine (1) 08/01/2022   . Coordination of Care:  1.  Have you been to the ER, urgent care clinic since your last visit? Hospitalized since your last visit? no    2. Have you seen or consulted any other health care providers outside of the [address] [place] [address] since your last visit? Include any pap smears or colon screening.  no

## 2022-12-05 ENCOUNTER — HOSPITAL ENCOUNTER (OUTPATIENT)
Dept: LAB | Age: 77
Discharge: HOME | End: 2022-12-05
Payer: MEDICARE

## 2022-12-05 DIAGNOSIS — R79.89 ELEVATED LFTS: ICD-10-CM

## 2022-12-05 LAB
ALBUMIN SERPL-MCNC: 4 G/DL (ref 3.4–5)
ALBUMIN/GLOB SERPL: 1 {RATIO} (ref 0.8–1.7)
ALP SERPL-CCNC: 293 U/L (ref 45–117)
ALT SERPL-CCNC: 35 U/L (ref 13–56)
AST SERPL-CCNC: 29 U/L (ref 10–38)
BILIRUB DIRECT SERPL-MCNC: 0.1 MG/DL (ref 0–0.2)
BILIRUB SERPL-MCNC: 0.5 MG/DL (ref 0.2–1)
GLOBULIN SER CALC-MCNC: 3.9 G/DL (ref 2–4)
PROT SERPL-MCNC: 7.9 G/DL (ref 6.4–8.2)

## 2022-12-05 PROCEDURE — 36415 COLL VENOUS BLD VENIPUNCTURE: CPT

## 2022-12-05 PROCEDURE — 80076 HEPATIC FUNCTION PANEL: CPT

## 2022-12-06 ENCOUNTER — TELEPHONE (OUTPATIENT)
Dept: FAMILY MEDICINE CLINIC | Age: 77
End: 2022-12-06

## 2022-12-06 NOTE — TELEPHONE ENCOUNTER
Unable to reach patient. Patient phone goes directly to  that has not been set up.     ----- Message from Racquel Gutierrez NP sent at 12/6/2022 10:37 AM EST -----  Liver enzymes back to normal.  We will discontinue atorvastatin, added to allergy list.  Unfortunately, patient may not have statin as it affects her liver.   We will further discuss on 12/20/2022 appointment

## 2022-12-06 NOTE — PROGRESS NOTES
Liver enzymes back to normal.  We will discontinue atorvastatin, added to allergy list.  Unfortunately, patient may not have statin as it affects her liver.   We will further discuss on 12/20/2022 appointment

## 2022-12-19 PROBLEM — R76.8 POSITIVE HEPATITIS C ANTIBODY TEST: Status: RESOLVED | Noted: 2022-01-04 | Resolved: 2022-12-19

## 2022-12-19 NOTE — PROGRESS NOTES
Brian Raymond is a 69 y.o. female who was seen by synchronous (real-time) audio-video technology on 12/20/2022 for Cholesterol Problem and Hypertension    Assessment & Plan:     1. Elevated LFTs  Assessment & Plan:  Improved since stopping atorvastatin, will not restart  Advised on lifestyle modifications  2. Hyperlipidemia LDL goal <100  Assessment & Plan:  Unfortunately developed transaminitis on statin, will discontinue  Advised on lifestyle modifications  Recheck lipid panel as scheduled in 5 mos  3. Primary hypertension  Assessment & Plan:  BP improved at home, goal <130/80, continue regimen  4. Encounter to discuss test results  Comments:  Hepatic function panel results discussed in detail with patient     Follow-up and Dispositions    Return in about 3 months (around 3/20/2023) for [address], cholesterol, blood pressure (no labs)- may schedule virtual.       SUBJECTIVE:     HPI    Elevated LFTs-  Symptoms:  None  ETOH: None   Risk factors: on atorvastatin 20 mg that was started January 2022 but normal LFTs in April 2022  Imaging:  None      Hyperlipidemia  Compliant with meds  Comorbid: HTN  Current treatment: atorvastatin 20 mg on hold d/t elevated LFTs     Hypertension-  Symptoms:  None  BP readings at home are 471-137E systolic  Comorbid: HLD  Current treatment: amlodipine 10 mg      Review of Systems   Constitutional:  Negative for chills, fever and malaise/fatigue. Respiratory:  Negative for shortness of breath. Cardiovascular:  Negative for chest pain. OBJECTIVE:     Physical Exam     Constitutional: Stable-appearing, in no distress, alert and oriented  HENT:   Head: Normocephalic and atraumatic. Ears:  Hearing grossly intact. Mouth:  No visible perioral lesions, cyanosis, or lip swelling. Pulmonary/Chest: Does not appear dyspneic, no audible wheezes or nasal flaring. Musculoskeletal: Grossly normal active ROM in upper extremities.    Neurological:  Intact recent memory, no facial or eyelid drooping, no speech impairment, answering questions appropriately. Psychiatric: Judgment and insight good, normal mood and affect. Pearley Curling, was evaluated through a synchronous (real-time) audio-video encounter. The patient (or guardian if applicable) is aware that this is a billable service, which includes applicable co-pays. This Virtual Visit was conducted with patient's (and/or legal guardian's) consent. The visit was conducted pursuant to the emergency declaration under the [address] [place] [address] authority and the [email or web address] and [employer] General Act. Patient identification was verified, and a caregiver was present when appropriate. The patient was located at: Home: [address] [place] [address]  The provider was located at:  Facility (Appt Department): [address] [place] [address] Jessee Toribio [address] [place] [address]      CONY Hidalgo

## 2022-12-19 NOTE — ASSESSMENT & PLAN NOTE
Unfortunately developed transaminitis on statin, will discontinue  Advised on lifestyle modifications  Recheck lipid panel as scheduled in 5 mos

## 2022-12-20 ENCOUNTER — TELEPHONE (OUTPATIENT)
Dept: FAMILY MEDICINE CLINIC | Age: 77
End: 2022-12-20

## 2022-12-20 ENCOUNTER — VIRTUAL VISIT (OUTPATIENT)
Dept: FAMILY MEDICINE CLINIC | Age: 77
End: 2022-12-20
Payer: MEDICARE

## 2022-12-20 DIAGNOSIS — E78.5 HYPERLIPIDEMIA LDL GOAL <100: ICD-10-CM

## 2022-12-20 DIAGNOSIS — Z71.2 ENCOUNTER TO DISCUSS TEST RESULTS: ICD-10-CM

## 2022-12-20 DIAGNOSIS — R79.89 ELEVATED LFTS: Primary | ICD-10-CM

## 2022-12-20 DIAGNOSIS — I10 PRIMARY HYPERTENSION: ICD-10-CM

## 2022-12-20 PROCEDURE — 1101F PT FALLS ASSESS-DOCD LE1/YR: CPT | Performed by: NURSE PRACTITIONER

## 2022-12-20 PROCEDURE — 1123F ACP DISCUSS/DSCN MKR DOCD: CPT | Performed by: NURSE PRACTITIONER

## 2022-12-20 PROCEDURE — G8756 NO BP MEASURE DOC: HCPCS | Performed by: NURSE PRACTITIONER

## 2022-12-20 PROCEDURE — G8399 PT W/DXA RESULTS DOCUMENT: HCPCS | Performed by: NURSE PRACTITIONER

## 2022-12-20 PROCEDURE — G8432 DEP SCR NOT DOC, RNG: HCPCS | Performed by: NURSE PRACTITIONER

## 2022-12-20 PROCEDURE — G8427 DOCREV CUR MEDS BY ELIG CLIN: HCPCS | Performed by: NURSE PRACTITIONER

## 2022-12-20 PROCEDURE — 99214 OFFICE O/P EST MOD 30 MIN: CPT | Performed by: NURSE PRACTITIONER

## 2022-12-20 PROCEDURE — 1090F PRES/ABSN URINE INCON ASSESS: CPT | Performed by: NURSE PRACTITIONER

## 2022-12-20 PROCEDURE — G8536 NO DOC ELDER MAL SCRN: HCPCS | Performed by: NURSE PRACTITIONER

## 2022-12-20 PROCEDURE — G8417 CALC BMI ABV UP PARAM F/U: HCPCS | Performed by: NURSE PRACTITIONER

## 2022-12-20 NOTE — TELEPHONE ENCOUNTER
Please call patient on her home phone and schedule patient for [address] in 3 mos virtually. Thank you!

## 2023-01-31 DIAGNOSIS — I10 PRIMARY HYPERTENSION: ICD-10-CM

## 2023-01-31 RX ORDER — AMLODIPINE BESYLATE 10 MG/1
10 TABLET ORAL DAILY
Qty: 90 TABLET | Refills: 1 | Status: SHIPPED | OUTPATIENT
Start: 2023-01-31

## 2023-01-31 NOTE — TELEPHONE ENCOUNTER
Patient's  came into the office for his wife who needs a medication refill. Please advise. Requested Prescriptions     Pending Prescriptions Disp Refills    amLODIPine (NORVASC) 10 mg tablet 90 Tablet 1     Sig: Take 1 Tablet by mouth daily.  Indications: high blood pressure

## 2023-02-04 DIAGNOSIS — E78.5 HYPERLIPIDEMIA LDL GOAL <100: ICD-10-CM

## 2023-02-04 DIAGNOSIS — R79.89 ELEVATED LFTS: Primary | ICD-10-CM

## 2023-02-05 DIAGNOSIS — I10 PRIMARY HYPERTENSION: Primary | ICD-10-CM

## 2023-02-05 DIAGNOSIS — E78.5 HYPERLIPIDEMIA LDL GOAL <100: Primary | ICD-10-CM

## 2023-03-22 ENCOUNTER — TELEPHONE (OUTPATIENT)
Facility: CLINIC | Age: 78
End: 2023-03-22

## 2023-03-22 ENCOUNTER — TELEMEDICINE (OUTPATIENT)
Facility: CLINIC | Age: 78
End: 2023-03-22
Payer: MEDICARE

## 2023-03-22 DIAGNOSIS — Z71.89 ACP (ADVANCE CARE PLANNING): ICD-10-CM

## 2023-03-22 DIAGNOSIS — Z00.00 MEDICARE ANNUAL WELLNESS VISIT, SUBSEQUENT: Primary | ICD-10-CM

## 2023-03-22 PROCEDURE — 1123F ACP DISCUSS/DSCN MKR DOCD: CPT | Performed by: NURSE PRACTITIONER

## 2023-03-22 PROCEDURE — G8484 FLU IMMUNIZE NO ADMIN: HCPCS | Performed by: NURSE PRACTITIONER

## 2023-03-22 PROCEDURE — 99497 ADVNCD CARE PLAN 30 MIN: CPT | Performed by: NURSE PRACTITIONER

## 2023-03-22 PROCEDURE — G0439 PPPS, SUBSEQ VISIT: HCPCS | Performed by: NURSE PRACTITIONER

## 2023-03-22 SDOH — ECONOMIC STABILITY: HOUSING INSECURITY
IN THE LAST 12 MONTHS, WAS THERE A TIME WHEN YOU DID NOT HAVE A STEADY PLACE TO SLEEP OR SLEPT IN A SHELTER (INCLUDING NOW)?: NO

## 2023-03-22 SDOH — ECONOMIC STABILITY: FOOD INSECURITY: WITHIN THE PAST 12 MONTHS, YOU WORRIED THAT YOUR FOOD WOULD RUN OUT BEFORE YOU GOT MONEY TO BUY MORE.: NEVER TRUE

## 2023-03-22 SDOH — ECONOMIC STABILITY: FOOD INSECURITY: WITHIN THE PAST 12 MONTHS, THE FOOD YOU BOUGHT JUST DIDN'T LAST AND YOU DIDN'T HAVE MONEY TO GET MORE.: NEVER TRUE

## 2023-03-22 SDOH — ECONOMIC STABILITY: INCOME INSECURITY: HOW HARD IS IT FOR YOU TO PAY FOR THE VERY BASICS LIKE FOOD, HOUSING, MEDICAL CARE, AND HEATING?: NOT HARD AT ALL

## 2023-03-22 ASSESSMENT — PATIENT HEALTH QUESTIONNAIRE - PHQ9
SUM OF ALL RESPONSES TO PHQ QUESTIONS 1-9: 0
1. LITTLE INTEREST OR PLEASURE IN DOING THINGS: 0
SUM OF ALL RESPONSES TO PHQ QUESTIONS 1-9: 0
SUM OF ALL RESPONSES TO PHQ9 QUESTIONS 1 & 2: 0
2. FEELING DOWN, DEPRESSED OR HOPELESS: 0
SUM OF ALL RESPONSES TO PHQ QUESTIONS 1-9: 0
SUM OF ALL RESPONSES TO PHQ QUESTIONS 1-9: 0

## 2023-03-22 ASSESSMENT — LIFESTYLE VARIABLES
HOW MANY STANDARD DRINKS CONTAINING ALCOHOL DO YOU HAVE ON A TYPICAL DAY: PATIENT DOES NOT DRINK
HOW OFTEN DO YOU HAVE A DRINK CONTAINING ALCOHOL: NEVER

## 2023-03-22 NOTE — TELEPHONE ENCOUNTER
Please print patient's lab order for CMP, lipid panel and mail to patient's address, which has been verified. Also, please schedule patient in the office in 2 mos for blood pressure, cholesterol, lab results. Thank you.

## 2023-03-22 NOTE — PROGRESS NOTES
Advance Care Planning     Advance Care Planning (ACP) Physician/NP/PA Conversation    Date of Conversation: 3/22/2023  Conducted with: Patient with Decision Making Capacity    Healthcare Decision Maker:    Primary Decision Maker: Talib Ayala Spouse - [phone]  Click here to complete Healthcare Decision Makers including selection of the Healthcare Decision Maker Relationship (ie \"Primary\"). Today we documented Decision Maker(s). The patient will provide ACP documents. Care Preferences:    Hospitalization: \"If your health worsens and it becomes clear that your chance of recovery is unlikely, what would be your preference regarding hospitalization? \"  The patient would prefer comfort-focused treatment without hospitalization. Ventilation: \"If you were unable to breath on your own and your chance of recovery was unlikely, what would be your preference about the use of a ventilator (breathing machine) if it was available to you? \"  The patient would NOT desire the use of a ventilator. Resuscitation: \"In the event your heart stopped as a result of an underlying serious health condition, would you want attempts made to restart your heart, or would you prefer a natural death? \"  No, do NOT attempt to resuscitate.       Conversation Outcomes / Follow-Up Plan:  ACP complete - no further action today      Length of Voluntary ACP Conversation in minutes:  16 minutes    FABIENNE Collier
Baldomero Mueller presents today for   Chief Complaint   Patient presents with    Medicare AWV       Virtual/telephone visit    Depression Screening:  PHQ-9 Questionaire 3/22/2023 12/20/2022 11/3/2022 5/3/2022 4/5/2022 1/4/2022 1/4/2022   Little interest or pleasure in doing things 0 0 0 0 0 0 0   Feeling down, depressed, or hopeless 0 0 0 0 0 0 0   PHQ-9 Total Score 0 0 0 0 0 0 0         Learning Assessment:  No question data found. Fall Risk  Fall Risk 3/22/2023   2 or more falls in past year? no   Fall with injury in past year? no         Travel Screening:    Travel Screening     No screening recorded since 03/21/23 0000       Travel History   Travel since 02/22/23    No documented travel since 02/22/23         Health Maintenance reviewed and discussed and ordered per Provider. Health Maintenance Due   Topic Date Due    DTaP/Tdap/Td vaccine (1 - Tdap) Never done    Shingles vaccine (1 of 2) Never done    Pneumococcal 65+ years Vaccine (1 - PCV) Never done    COVID-19 Vaccine (3 - Booster for Lawson Peter series) 06/04/2021    Annual Wellness Visit (AWV)  01/05/2023   . Coordination of Care:    1. Have you been to the ER, urgent care clinic since your last visit? Hospitalized since your last visit? no    2. Have you seen or consulted any other health care providers outside of the [address] [place] [address] since your last visit? Include any pap smears or colon screening.  no
amLODIPine (NORVASC) 10 MG tablet Take 10 mg by mouth daily Yes Ar Automatic Reconciliation       CareTeam (Including outside providers/suppliers regularly involved in providing care):   Patient Care Team:  FABIENNE Shook as PCP - General  FABIENNE Shook as PCP - Empaneled Provider     Reviewed and updated this visit:  Tobacco  Allergies  Meds  Problems  Med Hx  Surg Hx  Soc Hx  Fam Hx        Dearl Sierra, was evaluated through a synchronous (real-time) audio-video encounter. The patient (or guardian if applicable) is aware that this is a billable service, which includes applicable co-pays. This Virtual Visit was conducted with patient's (and/or legal guardian's) consent. The visit was conducted pursuant to the emergency declaration under the [address] [place] [address], [address] [place] [address] [facility] authority and the [employer] and [employer] General Act. Patient identification was verified, and a caregiver was present when appropriate.    The patient was located at Home: [address] [place] [address]  Provider was located at [facility] (Appt Dept): [address]  [place],  [address] [place] [address]    SHARON Shook

## 2023-03-22 NOTE — PATIENT INSTRUCTIONS
home  Remove raised doorway thresholds, throw rugs, and clutter. Repair loose carpet or raised areas in the floor. Move furniture and electrical cords to keep them out of walking paths. Use nonskid floor wax, and wipe up spills right away, especially on ceramic tile floors. If you use a walker or cane, put rubber tips on it. If you use crutches, clean the bottoms of them regularly with an abrasive pad, such as steel wool. Keep your house well lit, especially stairways, porches, and outside walkways. Use night-lights in areas such as hallways and bathrooms. Add extra light switches or use remote switches (such as switches that go on or off when you clap your hands) to make it easier to turn lights on if you have to get up during the night. Install sturdy handrails on stairways. Move items in your cabinets so that the things you use a lot are on the lower shelves (about waist level). Keep a cordless phone and a flashlight with new batteries by your bed. If possible, put a phone in each of the main rooms of your house, or carry a cell phone in case you fall and cannot reach a phone. Or, you can wear a device around your neck or wrist. You push a button that sends a signal for help. Wear low-heeled shoes that fit well and give your feet good support. Use footwear with nonskid soles. Check the heels and soles of your shoes for wear. Repair or replace worn heels or soles. Do not wear socks without shoes on smooth floors, such as wood. Walk on the grass when the sidewalks are slippery. If you live in an area that gets snow and ice in the winter, sprinkle salt on slippery steps and sidewalks. Or ask a family member or friend to do this for you. Preventing falls in the bath  Install grab bars and nonskid mats inside and outside your shower or tub and near the toilet and sinks. Use shower chairs and bath benches. Use a hand-held shower head that will allow you to sit while showering.   Get into a tub or shower by

## 2023-05-01 ENCOUNTER — TELEPHONE (OUTPATIENT)
Facility: CLINIC | Age: 78
End: 2023-05-01

## 2023-05-01 NOTE — TELEPHONE ENCOUNTER
Patient called and needs a medication refill.  Please advise  amLODIPine (NORVASC) 10 MG tablet       [facility] [address] [place]

## 2023-05-02 ENCOUNTER — HOSPITAL ENCOUNTER (OUTPATIENT)
Facility: HOSPITAL | Age: 78
Discharge: HOME | End: 2023-05-05
Payer: MEDICARE

## 2023-05-02 LAB
BASOPHILS # BLD: 0.1 K/UL (ref 0–0.1)
BASOPHILS NFR BLD: 1 % (ref 0–2)
DIFFERENTIAL METHOD BLD: ABNORMAL
EOSINOPHIL # BLD: 0.2 K/UL (ref 0–0.4)
EOSINOPHIL NFR BLD: 2 % (ref 0–5)
ERYTHROCYTE [DISTWIDTH] IN BLOOD BY AUTOMATED COUNT: 13.7 % (ref 11.6–14.5)
HCT VFR BLD AUTO: 45.3 % (ref 35–45)
HGB BLD-MCNC: 14.7 G/DL (ref 12–16)
IMM GRANULOCYTES # BLD AUTO: 0 K/UL (ref 0–0.04)
IMM GRANULOCYTES NFR BLD AUTO: 0 % (ref 0–0.5)
LYMPHOCYTES # BLD: 2.1 K/UL (ref 0.9–3.6)
LYMPHOCYTES NFR BLD: 21 % (ref 21–52)
MCH RBC QN AUTO: 32.1 PG (ref 24–34)
MCHC RBC AUTO-ENTMCNC: 32.5 G/DL (ref 31–37)
MCV RBC AUTO: 98.9 FL (ref 78–100)
MONOCYTES # BLD: 0.6 K/UL (ref 0.05–1.2)
MONOCYTES NFR BLD: 6 % (ref 3–10)
NEUTS SEG # BLD: 7.3 K/UL (ref 1.8–8)
NEUTS SEG NFR BLD: 71 % (ref 40–73)
NRBC # BLD: 0 K/UL (ref 0–0.01)
NRBC BLD-RTO: 0 PER 100 WBC
PLATELET # BLD AUTO: 297 K/UL (ref 135–420)
PMV BLD AUTO: 10.7 FL (ref 9.2–11.8)
RBC # BLD AUTO: 4.58 M/UL (ref 4.2–5.3)
WBC # BLD AUTO: 10.3 K/UL (ref 4.6–13.2)

## 2023-05-02 PROCEDURE — 85025 COMPLETE CBC W/AUTO DIFF WBC: CPT

## 2023-05-02 PROCEDURE — 36415 COLL VENOUS BLD VENIPUNCTURE: CPT

## 2023-05-09 PROBLEM — R79.89 ELEVATED LFTS: Status: RESOLVED | Noted: 2022-11-01 | Resolved: 2023-05-09

## 2023-05-24 ENCOUNTER — TELEPHONE (OUTPATIENT)
Facility: CLINIC | Age: 78
End: 2023-05-24

## 2023-05-24 DIAGNOSIS — E78.5 HYPERLIPIDEMIA LDL GOAL <100: Primary | ICD-10-CM

## 2023-05-24 NOTE — TELEPHONE ENCOUNTER
Please call and notify patient that unfortunately, her cholesterol panel was not processed by the lab. Only her CBC was processed so she will need to return to the lab to get those done. She will need to fast again and it might be best if they come  the order from our office to take to the lab to ensure that the correct order is completed. I have placed a new order in the system. It should be CMP, lipid panel. Let patient know we are addressing the error with the . I recommend they reschedule their appointment until those fasting labs are done. May reschedule appointment virtually if that is more convenient for them. Thank you.

## 2023-05-25 ENCOUNTER — HOSPITAL ENCOUNTER (OUTPATIENT)
Facility: HOSPITAL | Age: 78
Discharge: HOME | End: 2023-05-25
Payer: MEDICARE

## 2023-05-25 DIAGNOSIS — E78.5 HYPERLIPIDEMIA LDL GOAL <100: ICD-10-CM

## 2023-05-25 LAB
ALBUMIN SERPL-MCNC: 3.9 G/DL (ref 3.4–5)
ALBUMIN/GLOB SERPL: 1 (ref 0.8–1.7)
ALP SERPL-CCNC: 158 U/L (ref 45–117)
ALT SERPL-CCNC: 26 U/L (ref 13–56)
ANION GAP SERPL CALC-SCNC: 6 MMOL/L (ref 3–18)
AST SERPL-CCNC: 24 U/L (ref 10–38)
BILIRUB SERPL-MCNC: 0.9 MG/DL (ref 0.2–1)
BUN SERPL-MCNC: 17 MG/DL (ref 7–18)
BUN/CREAT SERPL: 19 (ref 12–20)
CALCIUM SERPL-MCNC: 9.9 MG/DL (ref 8.5–10.1)
CHLORIDE SERPL-SCNC: 105 MMOL/L (ref 100–111)
CHOLEST SERPL-MCNC: 298 MG/DL
CO2 SERPL-SCNC: 30 MMOL/L (ref 21–32)
CREAT SERPL-MCNC: 0.88 MG/DL (ref 0.6–1.3)
GLOBULIN SER CALC-MCNC: 4 G/DL (ref 2–4)
GLUCOSE SERPL-MCNC: 94 MG/DL (ref 74–99)
HDLC SERPL-MCNC: 80 MG/DL (ref 40–60)
HDLC SERPL: 3.7 (ref 0–5)
LDLC SERPL CALC-MCNC: 196.2 MG/DL (ref 0–100)
LIPID PANEL: ABNORMAL
POTASSIUM SERPL-SCNC: 4 MMOL/L (ref 3.5–5.5)
PROT SERPL-MCNC: 7.9 G/DL (ref 6.4–8.2)
SODIUM SERPL-SCNC: 141 MMOL/L (ref 136–145)
TRIGL SERPL-MCNC: 109 MG/DL
VLDLC SERPL CALC-MCNC: 21.8 MG/DL

## 2023-05-25 PROCEDURE — 36415 COLL VENOUS BLD VENIPUNCTURE: CPT

## 2023-05-25 PROCEDURE — 80061 LIPID PANEL: CPT

## 2023-05-25 PROCEDURE — 80053 COMPREHEN METABOLIC PANEL: CPT

## 2023-06-05 ASSESSMENT — ENCOUNTER SYMPTOMS: SHORTNESS OF BREATH: 0

## 2023-06-05 NOTE — ASSESSMENT & PLAN NOTE
LDL elevated, lifestyle modifications advised  Hx of transaminitis on statin  Recheck lipid panel in 3 mos and if no improvement on lifestyle modifications, may consider cardiology consult for PCSK9 inhibitor consideration

## 2023-06-07 ENCOUNTER — OFFICE VISIT (OUTPATIENT)
Facility: CLINIC | Age: 78
End: 2023-06-07
Payer: MEDICARE

## 2023-06-07 VITALS
WEIGHT: 140 LBS | SYSTOLIC BLOOD PRESSURE: 143 MMHG | BODY MASS INDEX: 25.76 KG/M2 | TEMPERATURE: 97.9 F | DIASTOLIC BLOOD PRESSURE: 82 MMHG | OXYGEN SATURATION: 98 % | HEIGHT: 62 IN | HEART RATE: 84 BPM | RESPIRATION RATE: 16 BRPM

## 2023-06-07 DIAGNOSIS — E78.5 HYPERLIPIDEMIA LDL GOAL <100: Primary | ICD-10-CM

## 2023-06-07 DIAGNOSIS — Z23 NEED FOR PROPHYLACTIC VACCINATION AGAINST STREPTOCOCCUS PNEUMONIAE (PNEUMOCOCCUS): ICD-10-CM

## 2023-06-07 DIAGNOSIS — Z71.2 ENCOUNTER TO DISCUSS TEST RESULTS: ICD-10-CM

## 2023-06-07 DIAGNOSIS — I10 PRIMARY HYPERTENSION: ICD-10-CM

## 2023-06-07 PROCEDURE — G8417 CALC BMI ABV UP PARAM F/U: HCPCS | Performed by: NURSE PRACTITIONER

## 2023-06-07 PROCEDURE — 99215 OFFICE O/P EST HI 40 MIN: CPT | Performed by: NURSE PRACTITIONER

## 2023-06-07 PROCEDURE — G8427 DOCREV CUR MEDS BY ELIG CLIN: HCPCS | Performed by: NURSE PRACTITIONER

## 2023-06-07 PROCEDURE — 3077F SYST BP >= 140 MM HG: CPT | Performed by: NURSE PRACTITIONER

## 2023-06-07 PROCEDURE — 1036F TOBACCO NON-USER: CPT | Performed by: NURSE PRACTITIONER

## 2023-06-07 PROCEDURE — G0009 ADMIN PNEUMOCOCCAL VACCINE: HCPCS | Performed by: NURSE PRACTITIONER

## 2023-06-07 PROCEDURE — G8399 PT W/DXA RESULTS DOCUMENT: HCPCS | Performed by: NURSE PRACTITIONER

## 2023-06-07 PROCEDURE — 1123F ACP DISCUSS/DSCN MKR DOCD: CPT | Performed by: NURSE PRACTITIONER

## 2023-06-07 PROCEDURE — 1090F PRES/ABSN URINE INCON ASSESS: CPT | Performed by: NURSE PRACTITIONER

## 2023-06-07 PROCEDURE — 3079F DIAST BP 80-89 MM HG: CPT | Performed by: NURSE PRACTITIONER

## 2023-06-07 PROCEDURE — 90677 PCV20 VACCINE IM: CPT | Performed by: NURSE PRACTITIONER

## 2023-06-07 RX ORDER — AMLODIPINE BESYLATE 10 MG/1
10 TABLET ORAL DAILY
Qty: 90 TABLET | Refills: 0 | Status: SHIPPED | OUTPATIENT
Start: 2023-06-07

## 2023-06-07 ASSESSMENT — PATIENT HEALTH QUESTIONNAIRE - PHQ9
2. FEELING DOWN, DEPRESSED OR HOPELESS: 0
SUM OF ALL RESPONSES TO PHQ QUESTIONS 1-9: 0
SUM OF ALL RESPONSES TO PHQ QUESTIONS 1-9: 0
1. LITTLE INTEREST OR PLEASURE IN DOING THINGS: 0
SUM OF ALL RESPONSES TO PHQ QUESTIONS 1-9: 0
SUM OF ALL RESPONSES TO PHQ QUESTIONS 1-9: 0
SUM OF ALL RESPONSES TO PHQ9 QUESTIONS 1 & 2: 0

## 2023-06-07 NOTE — PROGRESS NOTES
Chief Complaint   Patient presents with    Cholesterol Problem    Hypertension    Discuss Labs     Assessment & Plan:     1. Hyperlipidemia LDL goal <100  Assessment & Plan:  LDL elevated, lifestyle modifications advised  Hx of transaminitis on statin  Recheck lipid panel in 3 mos and if no improvement on lifestyle modifications, may consider cardiology consult for PCSK9 inhibitor  Orders:  -     Comprehensive Metabolic Panel; Future  -     Lipid Panel; Future  2. Primary hypertension  Assessment & Plan:  BP slightly elevated, goal <130/80  Normotensive home readings, continue amlodipine 10 mg  Re-evaluate in 3 mos at home setting, virtually  Orders:  -     Comprehensive Metabolic Panel; Future  -     amLODIPine (NORVASC) 10 MG tablet; Take 1 tablet by mouth daily, Disp-90 tablet, R-0Normal  3. Need for prophylactic vaccination against Streptococcus pneumoniae (pneumococcus)  -     Pneumococcal, PCV20, PREVNAR 21, (age 25 yrs+), IM, PF  4. Encounter to discuss test results    Follow-up and Dispositions    Return in about 3 months (around 9/7/2023) for cholesterol, blood pressure, lab results-virtually. Subjective:     HPI    Patient presents today, accompanied by , Nancy Moon    Hyperlipidemia  Comorbid: HTN  Current treatment: none d/t hx of transaminitis on statin, was discontinued     Hypertension-  Symptoms:  None  BP readings at home are 665-752P systolic  Comorbid: HLD  Current treatment: amlodipine 10 mg    Health Maintenance:  COVID-19 vac - due for booster  Pneumonia vac- given Prevnar 20 today  Shingles vac - recommended    Review of Systems   Respiratory:  Negative for shortness of breath. Cardiovascular:  Negative for chest pain and leg swelling. Neurological:  Negative for dizziness, light-headedness and headaches.      Objective:   BP (!) 143/82 (Site: Left Upper Arm, Position: Sitting, Cuff Size: Medium Adult)   Pulse 84   Temp 97.9 °F (36.6 °C) (Oral)   Resp 16   Ht 5' 2\"
Obtained consent from patient. Per verbal order from NP day Injection of Prevnar 20 administered. Verified by me  that this is the correct immunization/injection. Patient observed for 15 minutes with no adverse reaction.
Exercise per Week: 7 days    Minutes of Exercise per Session: 30 min   Stress: Not on file   Social Connections: Not on file   Intimate Partner Violence: Not on file   Depression: Not at risk    PHQ-2 Score: 0   Housing Stability: Unknown    Unable to Pay for Housing in the Last Year: Not on file    Number of [place] in the Last Year: Not on file    Unstable Housing in the Last Year: No        Health Maintenance Due   Topic Date Due    Shingles vaccine (2 of 3) 12/18/2008    Pneumococcal 65+ years Vaccine (1 - PCV) Never done    COVID-19 Vaccine (3 - Booster for Pfizer series) 06/04/2021   . Coordination of Care:  1. Have you been to the ER, urgent care clinic since your last visit? Hospitalized since your last visit? No     2. Have you seen or consulted any other health care providers outside of the [address] [place] [address] since your last visit? Include any pap smears or colon screening.  no

## 2023-06-07 NOTE — ASSESSMENT & PLAN NOTE
BP slightly elevated, goal <130/80  Normotensive home readings, continue amlodipine 10 mg  Re-evaluate in 3 mos at home setting, virtually

## 2023-08-22 ENCOUNTER — HOSPITAL ENCOUNTER (OUTPATIENT)
Facility: HOSPITAL | Age: 78
Discharge: HOME | End: 2023-08-25
Payer: MEDICARE

## 2023-08-22 DIAGNOSIS — E78.5 HYPERLIPIDEMIA LDL GOAL <100: ICD-10-CM

## 2023-08-22 DIAGNOSIS — I10 PRIMARY HYPERTENSION: ICD-10-CM

## 2023-08-22 LAB
ALBUMIN SERPL-MCNC: 3.7 G/DL (ref 3.4–5)
ALBUMIN/GLOB SERPL: 0.9 (ref 0.8–1.7)
ALP SERPL-CCNC: 148 U/L (ref 45–117)
ALT SERPL-CCNC: 25 U/L (ref 13–56)
ANION GAP SERPL CALC-SCNC: 8 MMOL/L (ref 3–18)
AST SERPL-CCNC: 25 U/L (ref 10–38)
BILIRUB SERPL-MCNC: 0.6 MG/DL (ref 0.2–1)
BUN SERPL-MCNC: 23 MG/DL (ref 7–18)
BUN/CREAT SERPL: 26 (ref 12–20)
CALCIUM SERPL-MCNC: 9.4 MG/DL (ref 8.5–10.1)
CHLORIDE SERPL-SCNC: 106 MMOL/L (ref 100–111)
CHOLEST SERPL-MCNC: 293 MG/DL
CO2 SERPL-SCNC: 27 MMOL/L (ref 21–32)
CREAT SERPL-MCNC: 0.88 MG/DL (ref 0.6–1.3)
GLOBULIN SER CALC-MCNC: 3.9 G/DL (ref 2–4)
GLUCOSE SERPL-MCNC: 90 MG/DL (ref 74–99)
HDLC SERPL-MCNC: 81 MG/DL (ref 40–60)
HDLC SERPL: 3.6 (ref 0–5)
LDLC SERPL CALC-MCNC: 193.8 MG/DL (ref 0–100)
LIPID PANEL: ABNORMAL
POTASSIUM SERPL-SCNC: 4 MMOL/L (ref 3.5–5.5)
PROT SERPL-MCNC: 7.6 G/DL (ref 6.4–8.2)
SODIUM SERPL-SCNC: 141 MMOL/L (ref 136–145)
TRIGL SERPL-MCNC: 91 MG/DL
VLDLC SERPL CALC-MCNC: 18.2 MG/DL

## 2023-08-22 PROCEDURE — 36415 COLL VENOUS BLD VENIPUNCTURE: CPT

## 2023-08-22 PROCEDURE — 80061 LIPID PANEL: CPT

## 2023-08-22 PROCEDURE — 80053 COMPREHEN METABOLIC PANEL: CPT

## 2023-09-06 ENCOUNTER — TELEPHONE (OUTPATIENT)
Facility: CLINIC | Age: 78
End: 2023-09-06

## 2023-09-06 NOTE — TELEPHONE ENCOUNTER
Please call and schedule patient for medicare wellness visit, cholesterol, blood pressure in 6 mos. Thank you!

## 2024-03-05 ENCOUNTER — TELEPHONE (OUTPATIENT)
Facility: CLINIC | Age: 79
End: 2024-03-05

## 2024-03-05 NOTE — TELEPHONE ENCOUNTER
Called patient to get rescheduled no answer left voicemail for patient to give the office a call back

## 2024-03-05 NOTE — TELEPHONE ENCOUNTER
Please call and reschedule patient AFTER 03/22/2024, as her MWV is not due until after that date.  Please remind patient to do fasting labs 1 week prior to appointment.  Thank you!

## 2024-03-06 ENCOUNTER — TELEPHONE (OUTPATIENT)
Facility: CLINIC | Age: 79
End: 2024-03-06

## 2024-03-11 ENCOUNTER — HOSPITAL ENCOUNTER (OUTPATIENT)
Facility: HOSPITAL | Age: 79
Discharge: HOME | End: 2024-03-14
Payer: MEDICARE

## 2024-03-11 DIAGNOSIS — E78.5 HYPERLIPIDEMIA LDL GOAL <100: ICD-10-CM

## 2024-03-11 DIAGNOSIS — I10 PRIMARY HYPERTENSION: ICD-10-CM

## 2024-03-11 LAB
ALBUMIN SERPL-MCNC: 4 G/DL (ref 3.4–5)
ALBUMIN/GLOB SERPL: 1.1 (ref 0.8–1.7)
ALP SERPL-CCNC: 106 U/L (ref 45–117)
ALT SERPL-CCNC: 17 U/L (ref 13–56)
ANION GAP SERPL CALC-SCNC: 7 MMOL/L (ref 3–18)
AST SERPL-CCNC: 19 U/L (ref 10–38)
BASOPHILS # BLD: 0.1 K/UL (ref 0–0.1)
BASOPHILS NFR BLD: 1 % (ref 0–2)
BILIRUB SERPL-MCNC: 0.7 MG/DL (ref 0.2–1)
BUN SERPL-MCNC: 13 MG/DL (ref 7–18)
BUN/CREAT SERPL: 16 (ref 12–20)
CALCIUM SERPL-MCNC: 9.6 MG/DL (ref 8.5–10.1)
CHLORIDE SERPL-SCNC: 107 MMOL/L (ref 100–111)
CHOLEST SERPL-MCNC: 288 MG/DL
CO2 SERPL-SCNC: 29 MMOL/L (ref 21–32)
CREAT SERPL-MCNC: 0.8 MG/DL (ref 0.6–1.3)
DIFFERENTIAL METHOD BLD: ABNORMAL
EOSINOPHIL # BLD: 0.1 K/UL (ref 0–0.4)
EOSINOPHIL NFR BLD: 2 % (ref 0–5)
ERYTHROCYTE [DISTWIDTH] IN BLOOD BY AUTOMATED COUNT: 14 % (ref 11.6–14.5)
GLOBULIN SER CALC-MCNC: 3.8 G/DL (ref 2–4)
GLUCOSE SERPL-MCNC: 100 MG/DL (ref 74–99)
HCT VFR BLD AUTO: 43.7 % (ref 35–45)
HDLC SERPL-MCNC: 77 MG/DL (ref 40–60)
HDLC SERPL: 3.7 (ref 0–5)
HGB BLD-MCNC: 14.8 G/DL (ref 12–16)
IMM GRANULOCYTES # BLD AUTO: 0 K/UL (ref 0–0.04)
IMM GRANULOCYTES NFR BLD AUTO: 0 % (ref 0–0.5)
LDLC SERPL CALC-MCNC: 185 MG/DL (ref 0–100)
LIPID PANEL: ABNORMAL
LYMPHOCYTES # BLD: 1.7 K/UL (ref 0.9–3.6)
LYMPHOCYTES NFR BLD: 20 % (ref 21–52)
MCH RBC QN AUTO: 35 PG (ref 24–34)
MCHC RBC AUTO-ENTMCNC: 33.9 G/DL (ref 31–37)
MCV RBC AUTO: 103.3 FL (ref 78–100)
MONOCYTES # BLD: 0.7 K/UL (ref 0.05–1.2)
MONOCYTES NFR BLD: 8 % (ref 3–10)
NEUTS SEG # BLD: 5.9 K/UL (ref 1.8–8)
NEUTS SEG NFR BLD: 69 % (ref 40–73)
NRBC # BLD: 0 K/UL (ref 0–0.01)
NRBC BLD-RTO: 0 PER 100 WBC
PLATELET # BLD AUTO: 279 K/UL (ref 135–420)
PMV BLD AUTO: 11 FL (ref 9.2–11.8)
POTASSIUM SERPL-SCNC: 3.5 MMOL/L (ref 3.5–5.5)
PROT SERPL-MCNC: 7.8 G/DL (ref 6.4–8.2)
RBC # BLD AUTO: 4.23 M/UL (ref 4.2–5.3)
SODIUM SERPL-SCNC: 143 MMOL/L (ref 136–145)
TRIGL SERPL-MCNC: 130 MG/DL
VLDLC SERPL CALC-MCNC: 26 MG/DL
WBC # BLD AUTO: 8.5 K/UL (ref 4.6–13.2)

## 2024-03-11 PROCEDURE — 80061 LIPID PANEL: CPT

## 2024-03-11 PROCEDURE — 80053 COMPREHEN METABOLIC PANEL: CPT

## 2024-03-11 PROCEDURE — 85025 COMPLETE CBC W/AUTO DIFF WBC: CPT

## 2024-03-11 PROCEDURE — 36415 COLL VENOUS BLD VENIPUNCTURE: CPT

## 2024-03-26 PROBLEM — R73.01 IFG (IMPAIRED FASTING GLUCOSE): Status: ACTIVE | Noted: 2024-03-26

## 2024-03-26 ASSESSMENT — ENCOUNTER SYMPTOMS: SHORTNESS OF BREATH: 0

## 2024-03-26 NOTE — ASSESSMENT & PLAN NOTE
LDL improved but remains elevated  Not a candidate for statin d/t hx of transaminitis  Declined cardiology consult for PCSK9 inhibitor consideration for now  Continue risk factor modifications  Recheck lipid panel in 6 mos

## 2024-03-26 NOTE — PROGRESS NOTES
Chief Complaint   Patient presents with    Medicare AWV    Cholesterol Problem    Blood Sugar Problem    Discuss Labs     Assessment & Plan:     1. Hyperlipidemia LDL goal <100  Assessment & Plan:  LDL improved but remains elevated  Not a candidate for statin d/t hx of transaminitis  Declined cardiology consult for PCSK9 inhibitor consideration for now  Continue risk factor modifications  Recheck lipid panel in 6 mos  Orders:  -     Comprehensive Metabolic Panel; Future  -     Lipid Panel; Future  2. IFG (impaired fasting glucose)  Assessment & Plan:  Borderline, continue risk factor modifications  Check A1c in 6 mos  Orders:  -     Comprehensive Metabolic Panel; Future  -     Hemoglobin A1C; Future  3. Primary hypertension  Assessment & Plan:  Normotensive home readings, continue amlodipine 10 mg daily  Orders:  -     Comprehensive Metabolic Panel; Future  -     Lipid Panel; Future  -     amLODIPine (NORVASC) 10 MG tablet; Take 1 tablet by mouth daily, Disp-90 tablet, R-1Normal  4. Needs flu shot  -     Influenza, FLUAD, (age 65 y+), IM, Preservative Free, 0.5 mL  5. Encounter to discuss test results  Comments:  Fasting labs reviewed in detail with patient    Follow-up and Dispositions    Return in about 6 months (around 9/27/2024) for cholesterol, blood pressure, lab results, (virtually).       Subjective:     HPI    Patient presents today, accompanied by , Yefri Omer    Hyperlipidemia  Comorbid: HTN  Current treatment: none d/t hx of transaminitis on statin, was discontinued     Hypertension-  Symptoms:  None  BP readings at home are:  does not recall but they have been normal  Comorbid: HLD  Current treatment: amlodipine 10 mg    IFG-  Diet: eating less fried foods, trying low-sodium foods  Exercise: none  Risk factors: HLD, HTN    Health Maintenance:  COVID-19 vac - due for booster  Flu vac- given today  Shingles vac - recommended  MWV - done today    Review of Systems   Respiratory:  Negative for

## 2024-03-27 ENCOUNTER — OFFICE VISIT (OUTPATIENT)
Facility: CLINIC | Age: 79
End: 2024-03-27
Payer: MEDICARE

## 2024-03-27 VITALS
HEART RATE: 107 BPM | TEMPERATURE: 97 F | SYSTOLIC BLOOD PRESSURE: 140 MMHG | RESPIRATION RATE: 20 BRPM | DIASTOLIC BLOOD PRESSURE: 93 MMHG | OXYGEN SATURATION: 98 %

## 2024-03-27 DIAGNOSIS — Z71.2 ENCOUNTER TO DISCUSS TEST RESULTS: ICD-10-CM

## 2024-03-27 DIAGNOSIS — R73.01 IFG (IMPAIRED FASTING GLUCOSE): ICD-10-CM

## 2024-03-27 DIAGNOSIS — Z71.89 ACP (ADVANCE CARE PLANNING): ICD-10-CM

## 2024-03-27 DIAGNOSIS — Z00.00 MEDICARE ANNUAL WELLNESS VISIT, SUBSEQUENT: Primary | ICD-10-CM

## 2024-03-27 DIAGNOSIS — Z23 NEEDS FLU SHOT: ICD-10-CM

## 2024-03-27 DIAGNOSIS — I10 PRIMARY HYPERTENSION: ICD-10-CM

## 2024-03-27 DIAGNOSIS — E78.5 HYPERLIPIDEMIA LDL GOAL <100: ICD-10-CM

## 2024-03-27 PROCEDURE — G8427 DOCREV CUR MEDS BY ELIG CLIN: HCPCS | Performed by: NURSE PRACTITIONER

## 2024-03-27 PROCEDURE — G8399 PT W/DXA RESULTS DOCUMENT: HCPCS | Performed by: NURSE PRACTITIONER

## 2024-03-27 PROCEDURE — 90694 VACC AIIV4 NO PRSRV 0.5ML IM: CPT | Performed by: NURSE PRACTITIONER

## 2024-03-27 PROCEDURE — 99214 OFFICE O/P EST MOD 30 MIN: CPT | Performed by: NURSE PRACTITIONER

## 2024-03-27 PROCEDURE — G0008 ADMIN INFLUENZA VIRUS VAC: HCPCS | Performed by: NURSE PRACTITIONER

## 2024-03-27 PROCEDURE — 1036F TOBACCO NON-USER: CPT | Performed by: NURSE PRACTITIONER

## 2024-03-27 PROCEDURE — G8484 FLU IMMUNIZE NO ADMIN: HCPCS | Performed by: NURSE PRACTITIONER

## 2024-03-27 PROCEDURE — 3079F DIAST BP 80-89 MM HG: CPT | Performed by: NURSE PRACTITIONER

## 2024-03-27 PROCEDURE — 99497 ADVNCD CARE PLAN 30 MIN: CPT | Performed by: NURSE PRACTITIONER

## 2024-03-27 PROCEDURE — 1123F ACP DISCUSS/DSCN MKR DOCD: CPT | Performed by: NURSE PRACTITIONER

## 2024-03-27 PROCEDURE — 1090F PRES/ABSN URINE INCON ASSESS: CPT | Performed by: NURSE PRACTITIONER

## 2024-03-27 PROCEDURE — G8417 CALC BMI ABV UP PARAM F/U: HCPCS | Performed by: NURSE PRACTITIONER

## 2024-03-27 PROCEDURE — G0439 PPPS, SUBSEQ VISIT: HCPCS | Performed by: NURSE PRACTITIONER

## 2024-03-27 PROCEDURE — 3077F SYST BP >= 140 MM HG: CPT | Performed by: NURSE PRACTITIONER

## 2024-03-27 RX ORDER — AMLODIPINE BESYLATE 10 MG/1
10 TABLET ORAL DAILY
Qty: 90 TABLET | Refills: 1 | Status: SHIPPED | OUTPATIENT
Start: 2024-03-27

## 2024-03-27 SDOH — ECONOMIC STABILITY: FOOD INSECURITY: WITHIN THE PAST 12 MONTHS, THE FOOD YOU BOUGHT JUST DIDN'T LAST AND YOU DIDN'T HAVE MONEY TO GET MORE.: NEVER TRUE

## 2024-03-27 SDOH — ECONOMIC STABILITY: FOOD INSECURITY: WITHIN THE PAST 12 MONTHS, YOU WORRIED THAT YOUR FOOD WOULD RUN OUT BEFORE YOU GOT MONEY TO BUY MORE.: NEVER TRUE

## 2024-03-27 SDOH — ECONOMIC STABILITY: INCOME INSECURITY: HOW HARD IS IT FOR YOU TO PAY FOR THE VERY BASICS LIKE FOOD, HOUSING, MEDICAL CARE, AND HEATING?: NOT HARD AT ALL

## 2024-03-27 ASSESSMENT — PATIENT HEALTH QUESTIONNAIRE - PHQ9
SUM OF ALL RESPONSES TO PHQ QUESTIONS 1-9: 0
2. FEELING DOWN, DEPRESSED OR HOPELESS: NOT AT ALL
SUM OF ALL RESPONSES TO PHQ QUESTIONS 1-9: 0
SUM OF ALL RESPONSES TO PHQ QUESTIONS 1-9: 0
1. LITTLE INTEREST OR PLEASURE IN DOING THINGS: NOT AT ALL
SUM OF ALL RESPONSES TO PHQ9 QUESTIONS 1 & 2: 0
SUM OF ALL RESPONSES TO PHQ QUESTIONS 1-9: 0

## 2024-03-27 NOTE — PATIENT INSTRUCTIONS
Occupational Therapy: Orders received. Chart reviewed and discussed with care team.? Occupational Therapy not indicated at this due to pt receiving assist with ADLs at baseline, no acute OT needs at this time, PT to follow for rehab needs while in hospital.? Will complete orders. PT to request new OT orders if functional status changes.        
doctor about stop-smoking programs and medicines. These can increase your chances of quitting for good. Quitting is one of the most important things you can do to protect your heart. It is never too late to quit. Try to avoid secondhand smoke too.     Stay at a weight that's healthy for you. Talk to your doctor if you need help losing weight.     Try to get 7 to 9 hours of sleep each night.     Limit alcohol to 2 drinks a day for men and 1 drink a day for women. Too much alcohol can cause health problems.     Manage other health problems such as diabetes, high blood pressure, and high cholesterol. If you think you may have a problem with alcohol or drug use, talk to your doctor.   Medicines    Take your medicines exactly as prescribed. Call your doctor if you think you are having a problem with your medicine.     If your doctor recommends aspirin, take the amount directed each day. Make sure you take aspirin and not another kind of pain reliever, such as acetaminophen (Tylenol).   When should you call for help?   Call 911 if you have symptoms of a heart attack. These may include:    Chest pain or pressure, or a strange feeling in the chest.     Sweating.     Shortness of breath.     Pain, pressure, or a strange feeling in the back, neck, jaw, or upper belly or in one or both shoulders or arms.     Lightheadedness or sudden weakness.     A fast or irregular heartbeat.   After you call 911, the  may tell you to chew 1 adult-strength or 2 to 4 low-dose aspirin. Wait for an ambulance. Do not try to drive yourself.  Watch closely for changes in your health, and be sure to contact your doctor if you have any problems.  Where can you learn more?  Go to https://www.[employer].net/patientEd and enter F075 to learn more about \"A Healthy Heart: Care Instructions.\"  Current as of: June 24, 2023               Content Version: 14.0  © 1814-3643 [employer].   Care instructions adapted under license by [facility] 
no

## 2024-03-27 NOTE — PROGRESS NOTES
Guillermina Omer presents today for   Chief Complaint   Patient presents with    Medicare AWV    Cholesterol Problem    Blood Sugar Problem    Discuss Labs     Obtained consent from patient.  Administered Fluad, per NP Carolynn  orders. Verified by me and GERARDO Aguilar that this is the correct immunization/injection. Patient observed for 15 minutes with no adverse reaction.      Is someone accompanying this pt? Yes, Yefri (spouse)    Is the patient using any DME equipment during OV? yes    Depression Screening:      3/27/2024     1:04 PM 9/6/2023     8:49 AM 6/7/2023     9:29 AM 3/22/2023     9:30 AM 12/20/2022    10:36 AM 11/3/2022    12:50 PM 5/3/2022    12:57 PM   PHQ-9 Questionaire   Little interest or pleasure in doing things 0 0 0 0 0 0 0   Feeling down, depressed, or hopeless 0 0 0 0 0 0 0   PHQ-9 Total Score 0 0 0 0 0 0 0        KENYATTA 7-Anxiety        No data to display                   Learning Assessment:  No question data found.     Fall Risk       No data to display                   Travel Screening:    Travel Screening     No screening recorded since 03/26/24 0000       Travel History   Travel since 02/27/24    No documented travel since 02/27/24          Health Maintenance reviewed and discussed and ordered per Provider.  Transportation Needs: Unknown (3/27/2024)    PRAPARE - Transportation     Lack of Transportation (Medical): Not on file     Lack of Transportation (Non-Medical): No      Food Insecurity: No Food Insecurity (3/27/2024)    Hunger Vital Sign     Worried About Running Out of Food in the Last Year: Never true     Ran Out of Food in the Last Year: Never true     Financial Resource Strain: Low Risk  (3/27/2024)    Overall Financial Resource Strain (CARDIA)     Difficulty of Paying Living Expenses: Not hard at all     Housing Stability: Unknown (3/27/2024)    Housing Stability Vital Sign     Unable to Pay for Housing in the Last Year: Not on file     Number of Places Lived in the Last Year: Not on

## 2024-03-27 NOTE — PROGRESS NOTES
Medicare Annual Wellness Visit    Guillermina Omer is here for Medicare AWV, Cholesterol Problem, Blood Sugar Problem, and Discuss Labs    Assessment & Plan   Medicare annual wellness visit, subsequent  ACP (advance care planning)     Recommendations for Preventive Services Due: see orders and patient instructions/AVS.  Recommended screening schedule for the next 5-10 years is provided to the patient in written form: see Patient Instructions/AVS.       Return in about 6 months (around 9/27/2024) for cholesterol, blood pressure, lab results.     Subjective       Patient's complete Health Risk Assessment and screening values have been reviewed and are found in Flowsheets. The following problems were reviewed today and where indicated follow up appointments were made and/or referrals ordered.    Positive Risk Factor Screenings with Interventions:                Activity, Diet, and Weight:  On average, how many days per week do you engage in moderate to strenuous exercise (like a brisk walk)?: 0 days  On average, how many minutes do you engage in exercise at this level?: 0 min    Do you eat balanced/healthy meals regularly?: Yes    There is no height or weight on file to calculate BMI.      Inactivity Interventions:  See AVS for additional education material            ADL's:   Patient reports needing help with:  Select all that apply: (!) Bathing  Select all that apply: (!) Laundry, Food Preparation, Transportation, Shopping, Banking/Finances  Interventions:   assists with above              Objective   Vitals:    03/27/24 1307 03/27/24 1309   BP: (!) 149/81 (!) 140/93   Site: Left Upper Arm Right Upper Arm   Position: Sitting Sitting   Pulse: (!) 107    Resp: 20    Temp: 97 °F (36.1 °C)    TempSrc: Temporal    SpO2: 98%       There is no height or weight on file to calculate BMI.             Allergies   Allergen Reactions    Atorvastatin Other (See Comments)     Transaminitis    Erythromycin      Other reaction(s):

## 2024-03-27 NOTE — PROGRESS NOTES
Advance Care Planning     Advance Care Planning (ACP) Physician/NP/PA Conversation    Date of Conversation: 3/27/2024  Conducted with: Patient with Decision Making Capacity    Healthcare Decision Maker:    Primary Decision Maker: Yefri Omer - Spouse - [phone]  Click here to complete Healthcare Decision Makers including selection of the Healthcare Decision Maker Relationship (ie \"Primary\").         Care Preferences:    Hospitalization:  \"If your health worsens and it becomes clear that your chance of recovery is unlikely, what would be your preference regarding hospitalization?\"  The patient would prefer comfort-focused treatment without hospitalization.    Ventilation:  \"If you were unable to breath on your own and your chance of recovery was unlikely, what would be your preference about the use of a ventilator (breathing machine) if it was available to you?\"  The patient would NOT desire the use of a ventilator.    Resuscitation:  \"In the event your heart stopped as a result of an underlying serious health condition, would you want attempts made to restart your heart, or would you prefer a natural death?\"  No, do NOT attempt to resuscitate.    None of the below per current ACP in place:  artificial nutrition, ventilation preferences, hospitalization preferences, and resuscitation preferences    Conversation Outcomes / Follow-Up Plan:  ACP complete - no further action today    Length of Voluntary ACP Conversation in minutes:  16 minutes    FABIENNE Whitlock

## 2024-09-10 ENCOUNTER — HOSPITAL ENCOUNTER (OUTPATIENT)
Facility: HOSPITAL | Age: 79
Discharge: HOME | End: 2024-09-13
Payer: MEDICARE

## 2024-09-10 DIAGNOSIS — E78.5 HYPERLIPIDEMIA LDL GOAL <100: ICD-10-CM

## 2024-09-10 DIAGNOSIS — I10 PRIMARY HYPERTENSION: ICD-10-CM

## 2024-09-10 DIAGNOSIS — R73.01 IFG (IMPAIRED FASTING GLUCOSE): ICD-10-CM

## 2024-09-10 LAB
ALBUMIN SERPL-MCNC: 4.4 G/DL (ref 3.4–5)
ALBUMIN/GLOB SERPL: 1.2 (ref 0.8–1.7)
ALP SERPL-CCNC: 123 U/L (ref 45–117)
ALT SERPL-CCNC: 19 U/L (ref 13–56)
ANION GAP SERPL CALC-SCNC: 9 MMOL/L (ref 3–18)
AST SERPL-CCNC: 25 U/L (ref 10–38)
BILIRUB SERPL-MCNC: 0.7 MG/DL (ref 0.2–1)
BUN SERPL-MCNC: 13 MG/DL (ref 7–18)
BUN/CREAT SERPL: 15 (ref 12–20)
CALCIUM SERPL-MCNC: 10.2 MG/DL (ref 8.5–10.1)
CHLORIDE SERPL-SCNC: 102 MMOL/L (ref 100–111)
CHOLEST SERPL-MCNC: 310 MG/DL
CO2 SERPL-SCNC: 27 MMOL/L (ref 21–32)
CREAT SERPL-MCNC: 0.84 MG/DL (ref 0.6–1.3)
EST. AVERAGE GLUCOSE BLD GHB EST-MCNC: 103 MG/DL
GLOBULIN SER CALC-MCNC: 3.6 G/DL (ref 2–4)
GLUCOSE SERPL-MCNC: 102 MG/DL (ref 74–99)
HBA1C MFR BLD: 5.2 % (ref 4.2–5.6)
HDLC SERPL-MCNC: 89 MG/DL (ref 40–60)
HDLC SERPL: 3.5 (ref 0–5)
LDLC SERPL CALC-MCNC: 198.6 MG/DL (ref 0–100)
LIPID PANEL: ABNORMAL
POTASSIUM SERPL-SCNC: 3.5 MMOL/L (ref 3.5–5.5)
PROT SERPL-MCNC: 8 G/DL (ref 6.4–8.2)
SODIUM SERPL-SCNC: 138 MMOL/L (ref 136–145)
TRIGL SERPL-MCNC: 112 MG/DL
VLDLC SERPL CALC-MCNC: 22.4 MG/DL

## 2024-09-10 PROCEDURE — 83036 HEMOGLOBIN GLYCOSYLATED A1C: CPT

## 2024-09-10 PROCEDURE — 80053 COMPREHEN METABOLIC PANEL: CPT

## 2024-09-10 PROCEDURE — 80061 LIPID PANEL: CPT

## 2024-09-10 PROCEDURE — 36415 COLL VENOUS BLD VENIPUNCTURE: CPT

## 2024-09-25 ASSESSMENT — ENCOUNTER SYMPTOMS: SHORTNESS OF BREATH: 0

## 2024-09-26 ENCOUNTER — TELEMEDICINE (OUTPATIENT)
Facility: CLINIC | Age: 79
End: 2024-09-26

## 2024-09-26 ENCOUNTER — TELEPHONE (OUTPATIENT)
Facility: CLINIC | Age: 79
End: 2024-09-26

## 2024-09-26 DIAGNOSIS — I10 PRIMARY HYPERTENSION: Primary | ICD-10-CM

## 2024-09-26 DIAGNOSIS — E78.5 HYPERLIPIDEMIA LDL GOAL <100: ICD-10-CM

## 2024-09-26 DIAGNOSIS — R73.01 IFG (IMPAIRED FASTING GLUCOSE): ICD-10-CM

## 2024-09-26 DIAGNOSIS — Z71.2 ENCOUNTER TO DISCUSS TEST RESULTS: ICD-10-CM

## 2024-09-26 RX ORDER — AMLODIPINE BESYLATE 10 MG/1
10 TABLET ORAL DAILY
Qty: 90 TABLET | Refills: 1 | Status: SHIPPED | OUTPATIENT
Start: 2024-09-26

## 2024-09-26 SDOH — ECONOMIC STABILITY: FOOD INSECURITY: WITHIN THE PAST 12 MONTHS, YOU WORRIED THAT YOUR FOOD WOULD RUN OUT BEFORE YOU GOT MONEY TO BUY MORE.: NEVER TRUE

## 2024-09-26 SDOH — ECONOMIC STABILITY: FOOD INSECURITY: WITHIN THE PAST 12 MONTHS, THE FOOD YOU BOUGHT JUST DIDN'T LAST AND YOU DIDN'T HAVE MONEY TO GET MORE.: NEVER TRUE

## 2024-09-26 ASSESSMENT — PATIENT HEALTH QUESTIONNAIRE - PHQ9
2. FEELING DOWN, DEPRESSED OR HOPELESS: NOT AT ALL
SUM OF ALL RESPONSES TO PHQ QUESTIONS 1-9: 0
SUM OF ALL RESPONSES TO PHQ QUESTIONS 1-9: 0
1. LITTLE INTEREST OR PLEASURE IN DOING THINGS: NOT AT ALL
SUM OF ALL RESPONSES TO PHQ9 QUESTIONS 1 & 2: 0
SUM OF ALL RESPONSES TO PHQ QUESTIONS 1-9: 0
SUM OF ALL RESPONSES TO PHQ QUESTIONS 1-9: 0